# Patient Record
Sex: MALE | Race: WHITE | NOT HISPANIC OR LATINO | Employment: FULL TIME | URBAN - METROPOLITAN AREA
[De-identification: names, ages, dates, MRNs, and addresses within clinical notes are randomized per-mention and may not be internally consistent; named-entity substitution may affect disease eponyms.]

---

## 2021-01-18 ENCOUNTER — HOSPITAL ENCOUNTER (OUTPATIENT)
Dept: OTHER | Facility: HOSPITAL | Age: 51
Discharge: HOME | End: 2021-01-18
Attending: PHYSICIAN ASSISTANT

## 2021-07-30 ENCOUNTER — HOSPITAL ENCOUNTER (INPATIENT)
Facility: HOSPITAL | Age: 51
LOS: 9 days | Discharge: HOME | End: 2021-08-08
Attending: EMERGENCY MEDICINE | Admitting: STUDENT IN AN ORGANIZED HEALTH CARE EDUCATION/TRAINING PROGRAM

## 2021-07-30 ENCOUNTER — APPOINTMENT (OUTPATIENT)
Dept: GENERAL RADIOLOGY | Facility: HOSPITAL | Age: 51
End: 2021-07-30

## 2021-07-30 DIAGNOSIS — J18.9 PNEUMONIA OF BOTH LUNGS DUE TO INFECTIOUS ORGANISM, UNSPECIFIED PART OF LUNG: Primary | ICD-10-CM

## 2021-07-30 DIAGNOSIS — J96.01 ACUTE RESPIRATORY FAILURE WITH HYPOXIA (HCC): ICD-10-CM

## 2021-07-30 DIAGNOSIS — U07.1 COVID-19: ICD-10-CM

## 2021-07-30 DIAGNOSIS — Z78.9 DECREASED ACTIVITIES OF DAILY LIVING (ADL): ICD-10-CM

## 2021-07-30 PROBLEM — E03.9 HYPOTHYROIDISM: Status: ACTIVE | Noted: 2021-07-30

## 2021-07-30 LAB
ALBUMIN SERPL-MCNC: 3.6 G/DL (ref 3.5–5.2)
ALBUMIN/GLOB SERPL: 1 G/DL
ALP SERPL-CCNC: 83 U/L (ref 39–117)
ALT SERPL W P-5'-P-CCNC: 86 U/L (ref 1–41)
ANION GAP SERPL CALCULATED.3IONS-SCNC: 16.6 MMOL/L (ref 5–15)
AST SERPL-CCNC: 134 U/L (ref 1–40)
BASOPHILS # BLD AUTO: 0.02 10*3/MM3 (ref 0–0.2)
BASOPHILS NFR BLD AUTO: 0.2 % (ref 0–1.5)
BILIRUB SERPL-MCNC: 1.1 MG/DL (ref 0–1.2)
BUN SERPL-MCNC: 22 MG/DL (ref 6–20)
BUN/CREAT SERPL: 16.3 (ref 7–25)
CALCIUM SPEC-SCNC: 9 MG/DL (ref 8.6–10.5)
CHLORIDE SERPL-SCNC: 89 MMOL/L (ref 98–107)
CO2 SERPL-SCNC: 25.4 MMOL/L (ref 22–29)
CREAT SERPL-MCNC: 1.35 MG/DL (ref 0.76–1.27)
D DIMER PPP FEU-MCNC: 0.62 MG/L (FEU) (ref 0–0.59)
D-LACTATE SERPL-SCNC: 1.5 MMOL/L (ref 0.5–2)
D-LACTATE SERPL-SCNC: 2.7 MMOL/L (ref 0.5–2)
DEPRECATED RDW RBC AUTO: 41.3 FL (ref 37–54)
EOSINOPHIL # BLD AUTO: 0 10*3/MM3 (ref 0–0.4)
EOSINOPHIL NFR BLD AUTO: 0 % (ref 0.3–6.2)
ERYTHROCYTE [DISTWIDTH] IN BLOOD BY AUTOMATED COUNT: 13.4 % (ref 12.3–15.4)
FERRITIN SERPL-MCNC: 3585 NG/ML (ref 30–400)
GFR SERPL CREATININE-BSD FRML MDRD: 55 ML/MIN/1.73
GLOBULIN UR ELPH-MCNC: 3.6 GM/DL
GLUCOSE SERPL-MCNC: 102 MG/DL (ref 65–99)
HCT VFR BLD AUTO: 54.2 % (ref 37.5–51)
HGB BLD-MCNC: 18.9 G/DL (ref 13–17.7)
HOLD SPECIMEN: NORMAL
IMM GRANULOCYTES # BLD AUTO: 0.1 10*3/MM3 (ref 0–0.05)
IMM GRANULOCYTES NFR BLD AUTO: 1.2 % (ref 0–0.5)
INR PPP: 1.03 (ref 2–3)
LDH SERPL-CCNC: 671 U/L (ref 135–225)
LYMPHOCYTES # BLD AUTO: 0.9 10*3/MM3 (ref 0.7–3.1)
LYMPHOCYTES NFR BLD AUTO: 11.1 % (ref 19.6–45.3)
MCH RBC QN AUTO: 30 PG (ref 26.6–33)
MCHC RBC AUTO-ENTMCNC: 34.9 G/DL (ref 31.5–35.7)
MCV RBC AUTO: 85.9 FL (ref 79–97)
MONOCYTES # BLD AUTO: 0.88 10*3/MM3 (ref 0.1–0.9)
MONOCYTES NFR BLD AUTO: 10.8 % (ref 5–12)
NEUTROPHILS NFR BLD AUTO: 6.24 10*3/MM3 (ref 1.7–7)
NEUTROPHILS NFR BLD AUTO: 76.7 % (ref 42.7–76)
NRBC BLD AUTO-RTO: 0 /100 WBC (ref 0–0.2)
NT-PROBNP SERPL-MCNC: 24.7 PG/ML (ref 0–900)
PLATELET # BLD AUTO: 245 10*3/MM3 (ref 140–450)
PMV BLD AUTO: 11.2 FL (ref 6–12)
POTASSIUM SERPL-SCNC: 3.9 MMOL/L (ref 3.5–5.2)
PROCALCITONIN SERPL-MCNC: 0.23 NG/ML (ref 0–0.25)
PROT SERPL-MCNC: 7.2 G/DL (ref 6–8.5)
PROTHROMBIN TIME: 11.2 SECONDS (ref 9.4–12)
RBC # BLD AUTO: 6.31 10*6/MM3 (ref 4.14–5.8)
SODIUM SERPL-SCNC: 131 MMOL/L (ref 136–145)
TROPONIN T SERPL-MCNC: <0.01 NG/ML (ref 0–0.03)
WBC # BLD AUTO: 8.14 10*3/MM3 (ref 3.4–10.8)
WHOLE BLOOD HOLD SPECIMEN: NORMAL

## 2021-07-30 PROCEDURE — 83880 ASSAY OF NATRIURETIC PEPTIDE: CPT | Performed by: EMERGENCY MEDICINE

## 2021-07-30 PROCEDURE — 85025 COMPLETE CBC W/AUTO DIFF WBC: CPT | Performed by: EMERGENCY MEDICINE

## 2021-07-30 PROCEDURE — 87040 BLOOD CULTURE FOR BACTERIA: CPT | Performed by: EMERGENCY MEDICINE

## 2021-07-30 PROCEDURE — 83605 ASSAY OF LACTIC ACID: CPT | Performed by: EMERGENCY MEDICINE

## 2021-07-30 PROCEDURE — 85610 PROTHROMBIN TIME: CPT | Performed by: EMERGENCY MEDICINE

## 2021-07-30 PROCEDURE — 71045 X-RAY EXAM CHEST 1 VIEW: CPT | Performed by: RADIOLOGY

## 2021-07-30 PROCEDURE — 94799 UNLISTED PULMONARY SVC/PX: CPT

## 2021-07-30 PROCEDURE — 99223 1ST HOSP IP/OBS HIGH 75: CPT | Performed by: GENERAL PRACTICE

## 2021-07-30 PROCEDURE — 25010000002 CEFTRIAXONE PER 250 MG: Performed by: EMERGENCY MEDICINE

## 2021-07-30 PROCEDURE — 83615 LACTATE (LD) (LDH) ENZYME: CPT | Performed by: PHYSICIAN ASSISTANT

## 2021-07-30 PROCEDURE — 84484 ASSAY OF TROPONIN QUANT: CPT | Performed by: EMERGENCY MEDICINE

## 2021-07-30 PROCEDURE — XW033E5 INTRODUCTION OF REMDESIVIR ANTI-INFECTIVE INTO PERIPHERAL VEIN, PERCUTANEOUS APPROACH, NEW TECHNOLOGY GROUP 5: ICD-10-PCS | Performed by: GENERAL PRACTICE

## 2021-07-30 PROCEDURE — 71045 X-RAY EXAM CHEST 1 VIEW: CPT

## 2021-07-30 PROCEDURE — 25010000002 AZITHROMYCIN PER 500 MG: Performed by: EMERGENCY MEDICINE

## 2021-07-30 PROCEDURE — 82728 ASSAY OF FERRITIN: CPT | Performed by: PHYSICIAN ASSISTANT

## 2021-07-30 PROCEDURE — 93005 ELECTROCARDIOGRAM TRACING: CPT | Performed by: EMERGENCY MEDICINE

## 2021-07-30 PROCEDURE — 85379 FIBRIN DEGRADATION QUANT: CPT | Performed by: PHYSICIAN ASSISTANT

## 2021-07-30 PROCEDURE — 80053 COMPREHEN METABOLIC PANEL: CPT | Performed by: EMERGENCY MEDICINE

## 2021-07-30 PROCEDURE — 25010000002 DEXAMETHASONE PER 1 MG: Performed by: EMERGENCY MEDICINE

## 2021-07-30 PROCEDURE — 99285 EMERGENCY DEPT VISIT HI MDM: CPT

## 2021-07-30 PROCEDURE — 94660 CPAP INITIATION&MGMT: CPT

## 2021-07-30 PROCEDURE — 93010 ELECTROCARDIOGRAM REPORT: CPT | Performed by: INTERNAL MEDICINE

## 2021-07-30 PROCEDURE — 84145 PROCALCITONIN (PCT): CPT | Performed by: PHYSICIAN ASSISTANT

## 2021-07-30 RX ORDER — DEXAMETHASONE SODIUM PHOSPHATE 10 MG/ML
10 INJECTION INTRAMUSCULAR; INTRAVENOUS ONCE
Status: COMPLETED | OUTPATIENT
Start: 2021-07-30 | End: 2021-07-30

## 2021-07-30 RX ORDER — SODIUM CHLORIDE 0.9 % (FLUSH) 0.9 %
10 SYRINGE (ML) INJECTION AS NEEDED
Status: DISCONTINUED | OUTPATIENT
Start: 2021-07-30 | End: 2021-08-08 | Stop reason: HOSPADM

## 2021-07-30 RX ORDER — SODIUM CHLORIDE 9 MG/ML
75 INJECTION, SOLUTION INTRAVENOUS CONTINUOUS
Status: DISCONTINUED | OUTPATIENT
Start: 2021-07-30 | End: 2021-07-31

## 2021-07-30 RX ORDER — LEVOTHYROXINE SODIUM 0.12 MG/1
125 TABLET ORAL DAILY
COMMUNITY

## 2021-07-30 RX ADMIN — AZITHROMYCIN 500 MG: 500 INJECTION, POWDER, LYOPHILIZED, FOR SOLUTION INTRAVENOUS at 20:49

## 2021-07-30 RX ADMIN — CEFTRIAXONE SODIUM 1 G: 1 INJECTION, POWDER, FOR SOLUTION INTRAMUSCULAR; INTRAVENOUS at 20:37

## 2021-07-30 RX ADMIN — SODIUM CHLORIDE 1000 ML: 9 INJECTION, SOLUTION INTRAVENOUS at 20:45

## 2021-07-30 RX ADMIN — DEXAMETHASONE SODIUM PHOSPHATE 10 MG: 10 INJECTION INTRAMUSCULAR; INTRAVENOUS at 20:46

## 2021-07-31 PROBLEM — U07.1 ACUTE RESPIRATORY DISTRESS SYNDROME (ARDS) DUE TO 2019 NOVEL CORONAVIRUS: Status: ACTIVE | Noted: 2021-07-31

## 2021-07-31 PROBLEM — U07.1 ACUTE HYPOXEMIC RESPIRATORY FAILURE DUE TO COVID-19: Status: ACTIVE | Noted: 2021-07-31

## 2021-07-31 PROBLEM — J12.82 PNEUMONIA DUE TO COVID-19 VIRUS: Status: ACTIVE | Noted: 2021-07-31

## 2021-07-31 PROBLEM — J80 ACUTE RESPIRATORY DISTRESS SYNDROME (ARDS) DUE TO 2019 NOVEL CORONAVIRUS (HCC): Status: ACTIVE | Noted: 2021-07-31

## 2021-07-31 PROBLEM — U07.1 PNEUMONIA DUE TO COVID-19 VIRUS: Status: ACTIVE | Noted: 2021-07-31

## 2021-07-31 PROBLEM — J96.01 ACUTE HYPOXEMIC RESPIRATORY FAILURE DUE TO COVID-19: Status: ACTIVE | Noted: 2021-07-31

## 2021-07-31 PROBLEM — R74.01 TRANSAMINITIS: Status: ACTIVE | Noted: 2021-07-31

## 2021-07-31 PROBLEM — N17.9 ACUTE KIDNEY INJURY (HCC): Status: ACTIVE | Noted: 2021-07-31

## 2021-07-31 PROBLEM — E87.1 HYPONATREMIA: Status: ACTIVE | Noted: 2021-07-31

## 2021-07-31 LAB
ALBUMIN SERPL-MCNC: 2.9 G/DL (ref 3.5–5.2)
ALBUMIN/GLOB SERPL: 0.9 G/DL
ALP SERPL-CCNC: 86 U/L (ref 39–117)
ALT SERPL W P-5'-P-CCNC: 97 U/L (ref 1–41)
ANION GAP SERPL CALCULATED.3IONS-SCNC: 13.8 MMOL/L (ref 5–15)
ARTERIAL PATENCY WRIST A: ABNORMAL
AST SERPL-CCNC: 157 U/L (ref 1–40)
BASE EXCESS BLDA CALC-SCNC: 0.5 MMOL/L (ref -2–2)
BASOPHILS # BLD AUTO: 0.06 10*3/MM3 (ref 0–0.2)
BASOPHILS NFR BLD AUTO: 1.1 % (ref 0–1.5)
BDY SITE: ABNORMAL
BILIRUB CONJ SERPL-MCNC: 0.3 MG/DL (ref 0–0.3)
BILIRUB SERPL-MCNC: 0.7 MG/DL (ref 0–1.2)
BUN SERPL-MCNC: 19 MG/DL (ref 6–20)
BUN/CREAT SERPL: 19.8 (ref 7–25)
CALCIUM SPEC-SCNC: 8.2 MG/DL (ref 8.6–10.5)
CHLORIDE SERPL-SCNC: 95 MMOL/L (ref 98–107)
CK SERPL-CCNC: 973 U/L (ref 20–200)
CO2 SERPL-SCNC: 23.2 MMOL/L (ref 22–29)
COHGB MFR BLD: 0.3 % (ref 0–1.5)
CREAT SERPL-MCNC: 0.96 MG/DL (ref 0.76–1.27)
CRP SERPL-MCNC: 10.59 MG/DL (ref 0–0.5)
D DIMER PPP FEU-MCNC: 0.59 MG/L (FEU) (ref 0–0.59)
DEPRECATED RDW RBC AUTO: 41 FL (ref 37–54)
EOSINOPHIL # BLD AUTO: 0 10*3/MM3 (ref 0–0.4)
EOSINOPHIL NFR BLD AUTO: 0 % (ref 0.3–6.2)
ERYTHROCYTE [DISTWIDTH] IN BLOOD BY AUTOMATED COUNT: 13 % (ref 12.3–15.4)
FERRITIN SERPL-MCNC: 3340 NG/ML (ref 30–400)
FHHB: 10.9 % (ref 0–5)
FIBRINOGEN PPP-MCNC: 554 MG/DL (ref 200–450)
GAS FLOW AIRWAY: 60 LPM
GFR SERPL CREATININE-BSD FRML MDRD: 82 ML/MIN/1.73
GLOBULIN UR ELPH-MCNC: 3.4 GM/DL
GLUCOSE SERPL-MCNC: 131 MG/DL (ref 65–99)
HCO3 BLDA-SCNC: 23.4 MMOL/L (ref 22–26)
HCT VFR BLD AUTO: 49.5 % (ref 37.5–51)
HGB BLD-MCNC: 17.2 G/DL (ref 13–17.7)
HGB BLDA-MCNC: 17.9 G/DL (ref 13.8–16.4)
IMM GRANULOCYTES # BLD AUTO: 0.15 10*3/MM3 (ref 0–0.05)
IMM GRANULOCYTES NFR BLD AUTO: 2.8 % (ref 0–0.5)
INHALED O2 CONCENTRATION: 100 %
L PNEUMO1 AG UR QL IA: NEGATIVE
LDH SERPL-CCNC: 591 U/L (ref 135–225)
LYMPHOCYTES # BLD AUTO: 0.66 10*3/MM3 (ref 0.7–3.1)
LYMPHOCYTES NFR BLD AUTO: 12.2 % (ref 19.6–45.3)
MAGNESIUM SERPL-MCNC: 2.2 MG/DL (ref 1.6–2.6)
MCH RBC QN AUTO: 30.2 PG (ref 26.6–33)
MCHC RBC AUTO-ENTMCNC: 34.7 G/DL (ref 31.5–35.7)
MCV RBC AUTO: 86.8 FL (ref 79–97)
METHGB BLD QL: 0.3 % (ref 0–1.5)
MODALITY: ABNORMAL
MONOCYTES # BLD AUTO: 0.32 10*3/MM3 (ref 0.1–0.9)
MONOCYTES NFR BLD AUTO: 5.9 % (ref 5–12)
NEUTROPHILS NFR BLD AUTO: 4.22 10*3/MM3 (ref 1.7–7)
NEUTROPHILS NFR BLD AUTO: 78 % (ref 42.7–76)
NRBC BLD AUTO-RTO: 0 /100 WBC (ref 0–0.2)
OXYHGB MFR BLDV: 88.5 % (ref 94–99)
PCO2 BLDA: 33.7 MM HG (ref 35–45)
PH BLDA: 7.46 PH UNITS (ref 7.35–7.45)
PLATELET # BLD AUTO: 222 10*3/MM3 (ref 140–450)
PMV BLD AUTO: 11.4 FL (ref 6–12)
PO2 BLD: 54 MM[HG] (ref 0–500)
PO2 BLDA: 54 MM HG (ref 80–100)
POTASSIUM SERPL-SCNC: 4.4 MMOL/L (ref 3.5–5.2)
PROT SERPL-MCNC: 6.3 G/DL (ref 6–8.5)
QT INTERVAL: 326 MS
RBC # BLD AUTO: 5.7 10*6/MM3 (ref 4.14–5.8)
S PNEUM AG SPEC QL LA: NEGATIVE
SAO2 % BLDCOA: 89 % (ref 95–99)
SODIUM SERPL-SCNC: 132 MMOL/L (ref 136–145)
WBC # BLD AUTO: 5.41 10*3/MM3 (ref 3.4–10.8)

## 2021-07-31 PROCEDURE — 25010000002 ENOXAPARIN PER 10 MG: Performed by: GENERAL PRACTICE

## 2021-07-31 PROCEDURE — 82728 ASSAY OF FERRITIN: CPT | Performed by: PHYSICIAN ASSISTANT

## 2021-07-31 PROCEDURE — 85025 COMPLETE CBC W/AUTO DIFF WBC: CPT | Performed by: PHYSICIAN ASSISTANT

## 2021-07-31 PROCEDURE — 25010000002 TOCILIZUMAB 200 MG/10ML SOLUTION 10 ML VIAL: Performed by: INTERNAL MEDICINE

## 2021-07-31 PROCEDURE — 82550 ASSAY OF CK (CPK): CPT | Performed by: PHYSICIAN ASSISTANT

## 2021-07-31 PROCEDURE — 25010000002 CEFTRIAXONE PER 250 MG: Performed by: PHYSICIAN ASSISTANT

## 2021-07-31 PROCEDURE — 85384 FIBRINOGEN ACTIVITY: CPT | Performed by: PHYSICIAN ASSISTANT

## 2021-07-31 PROCEDURE — 25010000002 FUROSEMIDE PER 20 MG: Performed by: INTERNAL MEDICINE

## 2021-07-31 PROCEDURE — 82375 ASSAY CARBOXYHB QUANT: CPT | Performed by: GENERAL PRACTICE

## 2021-07-31 PROCEDURE — 36600 WITHDRAWAL OF ARTERIAL BLOOD: CPT | Performed by: GENERAL PRACTICE

## 2021-07-31 PROCEDURE — 94799 UNLISTED PULMONARY SVC/PX: CPT

## 2021-07-31 PROCEDURE — 99233 SBSQ HOSP IP/OBS HIGH 50: CPT | Performed by: INTERNAL MEDICINE

## 2021-07-31 PROCEDURE — 87899 AGENT NOS ASSAY W/OPTIC: CPT | Performed by: PHYSICIAN ASSISTANT

## 2021-07-31 PROCEDURE — 63710000001 DEXAMETHASONE PER 0.25 MG: Performed by: PHYSICIAN ASSISTANT

## 2021-07-31 PROCEDURE — 85379 FIBRIN DEGRADATION QUANT: CPT | Performed by: PHYSICIAN ASSISTANT

## 2021-07-31 PROCEDURE — 99291 CRITICAL CARE FIRST HOUR: CPT | Performed by: INTERNAL MEDICINE

## 2021-07-31 PROCEDURE — 82805 BLOOD GASES W/O2 SATURATION: CPT | Performed by: GENERAL PRACTICE

## 2021-07-31 PROCEDURE — 83050 HGB METHEMOGLOBIN QUAN: CPT | Performed by: GENERAL PRACTICE

## 2021-07-31 PROCEDURE — 80053 COMPREHEN METABOLIC PANEL: CPT | Performed by: PHYSICIAN ASSISTANT

## 2021-07-31 PROCEDURE — 83615 LACTATE (LD) (LDH) ENZYME: CPT | Performed by: PHYSICIAN ASSISTANT

## 2021-07-31 PROCEDURE — 83735 ASSAY OF MAGNESIUM: CPT | Performed by: PHYSICIAN ASSISTANT

## 2021-07-31 PROCEDURE — 25010000002 ENOXAPARIN PER 10 MG: Performed by: INTERNAL MEDICINE

## 2021-07-31 PROCEDURE — 25010000002 AZITHROMYCIN PER 500 MG: Performed by: PHYSICIAN ASSISTANT

## 2021-07-31 PROCEDURE — 86140 C-REACTIVE PROTEIN: CPT | Performed by: PHYSICIAN ASSISTANT

## 2021-07-31 PROCEDURE — XW033H5 INTRODUCTION OF TOCILIZUMAB INTO PERIPHERAL VEIN, PERCUTANEOUS APPROACH, NEW TECHNOLOGY GROUP 5: ICD-10-PCS | Performed by: INTERNAL MEDICINE

## 2021-07-31 PROCEDURE — 82248 BILIRUBIN DIRECT: CPT | Performed by: GENERAL PRACTICE

## 2021-07-31 PROCEDURE — 36415 COLL VENOUS BLD VENIPUNCTURE: CPT | Performed by: PHYSICIAN ASSISTANT

## 2021-07-31 RX ORDER — LEVOTHYROXINE SODIUM 0.12 MG/1
125 TABLET ORAL EVERY MORNING
Status: DISCONTINUED | OUTPATIENT
Start: 2021-07-31 | End: 2021-08-08 | Stop reason: HOSPADM

## 2021-07-31 RX ORDER — HEPARIN SODIUM 5000 [USP'U]/ML
5000 INJECTION, SOLUTION INTRAVENOUS; SUBCUTANEOUS EVERY 8 HOURS SCHEDULED
Status: DISCONTINUED | OUTPATIENT
Start: 2021-07-31 | End: 2021-07-31 | Stop reason: ALTCHOICE

## 2021-07-31 RX ORDER — IPRATROPIUM BROMIDE AND ALBUTEROL SULFATE 2.5; .5 MG/3ML; MG/3ML
3 SOLUTION RESPIRATORY (INHALATION)
Status: DISCONTINUED | OUTPATIENT
Start: 2021-07-31 | End: 2021-08-08 | Stop reason: HOSPADM

## 2021-07-31 RX ORDER — BUDESONIDE 0.5 MG/2ML
0.5 INHALANT ORAL
Status: DISCONTINUED | OUTPATIENT
Start: 2021-07-31 | End: 2021-08-08 | Stop reason: HOSPADM

## 2021-07-31 RX ORDER — FUROSEMIDE 10 MG/ML
40 INJECTION INTRAMUSCULAR; INTRAVENOUS
Status: DISCONTINUED | OUTPATIENT
Start: 2021-07-31 | End: 2021-07-31

## 2021-07-31 RX ORDER — DEXAMETHASONE SODIUM PHOSPHATE 10 MG/ML
6 INJECTION INTRAMUSCULAR; INTRAVENOUS DAILY
Status: DISCONTINUED | OUTPATIENT
Start: 2021-07-31 | End: 2021-08-02

## 2021-07-31 RX ORDER — ARFORMOTEROL TARTRATE 15 UG/2ML
15 SOLUTION RESPIRATORY (INHALATION)
Status: DISCONTINUED | OUTPATIENT
Start: 2021-07-31 | End: 2021-08-08 | Stop reason: HOSPADM

## 2021-07-31 RX ORDER — SODIUM CHLORIDE FOR INHALATION 3 %
4 VIAL, NEBULIZER (ML) INHALATION ONCE AS NEEDED
Status: COMPLETED | OUTPATIENT
Start: 2021-07-31 | End: 2021-07-31

## 2021-07-31 RX ORDER — DEXAMETHASONE 4 MG/1
6 TABLET ORAL DAILY
Status: DISCONTINUED | OUTPATIENT
Start: 2021-07-31 | End: 2021-08-02

## 2021-07-31 RX ORDER — ALBUTEROL SULFATE 2.5 MG/3ML
2.5 SOLUTION RESPIRATORY (INHALATION) ONCE AS NEEDED
Status: DISCONTINUED | OUTPATIENT
Start: 2021-07-31 | End: 2021-08-08 | Stop reason: HOSPADM

## 2021-07-31 RX ORDER — PANTOPRAZOLE SODIUM 40 MG/1
40 TABLET, DELAYED RELEASE ORAL
Status: DISCONTINUED | OUTPATIENT
Start: 2021-07-31 | End: 2021-08-08 | Stop reason: HOSPADM

## 2021-07-31 RX ADMIN — IPRATROPIUM BROMIDE AND ALBUTEROL SULFATE 3 ML: .5; 2.5 SOLUTION RESPIRATORY (INHALATION) at 08:18

## 2021-07-31 RX ADMIN — ENOXAPARIN SODIUM 120 MG: 120 INJECTION SUBCUTANEOUS at 00:59

## 2021-07-31 RX ADMIN — LEVOTHYROXINE SODIUM 125 MCG: 125 TABLET ORAL at 08:00

## 2021-07-31 RX ADMIN — IPRATROPIUM BROMIDE AND ALBUTEROL SULFATE 3 ML: .5; 2.5 SOLUTION RESPIRATORY (INHALATION) at 12:20

## 2021-07-31 RX ADMIN — ENOXAPARIN SODIUM 40 MG: 40 INJECTION SUBCUTANEOUS at 12:32

## 2021-07-31 RX ADMIN — TOCILIZUMAB 800 MG: 20 INJECTION, SOLUTION, CONCENTRATE INTRAVENOUS at 12:33

## 2021-07-31 RX ADMIN — SODIUM CHLORIDE SOLN NEBU 3% 4 ML: 3 NEBU SOLN at 19:16

## 2021-07-31 RX ADMIN — REMDESIVIR 200 MG: 100 INJECTION, POWDER, LYOPHILIZED, FOR SOLUTION INTRAVENOUS at 00:59

## 2021-07-31 RX ADMIN — CEFTRIAXONE 1 G: 10 INJECTION, POWDER, FOR SOLUTION INTRAVENOUS at 08:00

## 2021-07-31 RX ADMIN — FUROSEMIDE 40 MG: 10 INJECTION, SOLUTION INTRAMUSCULAR; INTRAVENOUS at 17:48

## 2021-07-31 RX ADMIN — IPRATROPIUM BROMIDE AND ALBUTEROL SULFATE 3 ML: .5; 2.5 SOLUTION RESPIRATORY (INHALATION) at 19:15

## 2021-07-31 RX ADMIN — AZITHROMYCIN 500 MG: 500 INJECTION, POWDER, LYOPHILIZED, FOR SOLUTION INTRAVENOUS at 08:00

## 2021-07-31 RX ADMIN — PANTOPRAZOLE SODIUM 40 MG: 40 TABLET, DELAYED RELEASE ORAL at 12:32

## 2021-07-31 RX ADMIN — ARFORMOTEROL TARTRATE 15 MCG: 15 SOLUTION RESPIRATORY (INHALATION) at 19:15

## 2021-07-31 RX ADMIN — DEXAMETHASONE 6 MG: 4 TABLET ORAL at 08:00

## 2021-07-31 RX ADMIN — FUROSEMIDE 40 MG: 10 INJECTION, SOLUTION INTRAMUSCULAR; INTRAVENOUS at 12:32

## 2021-07-31 RX ADMIN — SODIUM CHLORIDE 75 ML/HR: 9 INJECTION, SOLUTION INTRAVENOUS at 01:00

## 2021-07-31 RX ADMIN — BUDESONIDE 0.5 MG: 0.5 INHALANT ORAL at 19:15

## 2021-08-01 LAB
ALBUMIN SERPL-MCNC: 3.1 G/DL (ref 3.5–5.2)
ALBUMIN/GLOB SERPL: 1 G/DL
ALP SERPL-CCNC: 87 U/L (ref 39–117)
ALT SERPL W P-5'-P-CCNC: 95 U/L (ref 1–41)
ANION GAP SERPL CALCULATED.3IONS-SCNC: 9.5 MMOL/L (ref 5–15)
AST SERPL-CCNC: 93 U/L (ref 1–40)
BASOPHILS # BLD AUTO: 0.04 10*3/MM3 (ref 0–0.2)
BASOPHILS NFR BLD AUTO: 0.4 % (ref 0–1.5)
BILIRUB CONJ SERPL-MCNC: 0.2 MG/DL (ref 0–0.3)
BILIRUB SERPL-MCNC: 0.6 MG/DL (ref 0–1.2)
BUN SERPL-MCNC: 21 MG/DL (ref 6–20)
BUN/CREAT SERPL: 19.4 (ref 7–25)
CALCIUM SPEC-SCNC: 8.8 MG/DL (ref 8.6–10.5)
CHLORIDE SERPL-SCNC: 99 MMOL/L (ref 98–107)
CK SERPL-CCNC: 326 U/L (ref 20–200)
CO2 SERPL-SCNC: 26.5 MMOL/L (ref 22–29)
CREAT SERPL-MCNC: 1.08 MG/DL (ref 0.76–1.27)
CRP SERPL-MCNC: 5.76 MG/DL (ref 0–0.5)
DEPRECATED RDW RBC AUTO: 40.1 FL (ref 37–54)
EOSINOPHIL # BLD AUTO: 0 10*3/MM3 (ref 0–0.4)
EOSINOPHIL NFR BLD AUTO: 0 % (ref 0.3–6.2)
ERYTHROCYTE [DISTWIDTH] IN BLOOD BY AUTOMATED COUNT: 12.8 % (ref 12.3–15.4)
FERRITIN SERPL-MCNC: 3486 NG/ML (ref 30–400)
GFR SERPL CREATININE-BSD FRML MDRD: 72 ML/MIN/1.73
GLOBULIN UR ELPH-MCNC: 3.2 GM/DL
GLUCOSE SERPL-MCNC: 129 MG/DL (ref 65–99)
HCT VFR BLD AUTO: 48.8 % (ref 37.5–51)
HGB BLD-MCNC: 16.8 G/DL (ref 13–17.7)
IMM GRANULOCYTES # BLD AUTO: 0.18 10*3/MM3 (ref 0–0.05)
IMM GRANULOCYTES NFR BLD AUTO: 1.7 % (ref 0–0.5)
LYMPHOCYTES # BLD AUTO: 0.79 10*3/MM3 (ref 0.7–3.1)
LYMPHOCYTES NFR BLD AUTO: 7.4 % (ref 19.6–45.3)
MAGNESIUM SERPL-MCNC: 2.3 MG/DL (ref 1.6–2.6)
MCH RBC QN AUTO: 29.7 PG (ref 26.6–33)
MCHC RBC AUTO-ENTMCNC: 34.4 G/DL (ref 31.5–35.7)
MCV RBC AUTO: 86.2 FL (ref 79–97)
MONOCYTES # BLD AUTO: 0.9 10*3/MM3 (ref 0.1–0.9)
MONOCYTES NFR BLD AUTO: 8.5 % (ref 5–12)
NEUTROPHILS NFR BLD AUTO: 8.73 10*3/MM3 (ref 1.7–7)
NEUTROPHILS NFR BLD AUTO: 82 % (ref 42.7–76)
NRBC BLD AUTO-RTO: 0 /100 WBC (ref 0–0.2)
PLATELET # BLD AUTO: 284 10*3/MM3 (ref 140–450)
PMV BLD AUTO: 11.7 FL (ref 6–12)
POTASSIUM SERPL-SCNC: 3.9 MMOL/L (ref 3.5–5.2)
PROT SERPL-MCNC: 6.3 G/DL (ref 6–8.5)
RBC # BLD AUTO: 5.66 10*6/MM3 (ref 4.14–5.8)
SODIUM SERPL-SCNC: 135 MMOL/L (ref 136–145)
WBC # BLD AUTO: 10.64 10*3/MM3 (ref 3.4–10.8)

## 2021-08-01 PROCEDURE — 36415 COLL VENOUS BLD VENIPUNCTURE: CPT | Performed by: GENERAL PRACTICE

## 2021-08-01 PROCEDURE — 25010000002 DEXAMETHASONE PER 1 MG: Performed by: PHYSICIAN ASSISTANT

## 2021-08-01 PROCEDURE — 25010000002 TOCILIZUMAB 200 MG/10ML SOLUTION 10 ML VIAL: Performed by: INTERNAL MEDICINE

## 2021-08-01 PROCEDURE — 82728 ASSAY OF FERRITIN: CPT | Performed by: PHYSICIAN ASSISTANT

## 2021-08-01 PROCEDURE — 99233 SBSQ HOSP IP/OBS HIGH 50: CPT | Performed by: INTERNAL MEDICINE

## 2021-08-01 PROCEDURE — 25010000002 CEFTRIAXONE PER 250 MG: Performed by: PHYSICIAN ASSISTANT

## 2021-08-01 PROCEDURE — 82248 BILIRUBIN DIRECT: CPT | Performed by: GENERAL PRACTICE

## 2021-08-01 PROCEDURE — 86140 C-REACTIVE PROTEIN: CPT | Performed by: PHYSICIAN ASSISTANT

## 2021-08-01 PROCEDURE — 94799 UNLISTED PULMONARY SVC/PX: CPT

## 2021-08-01 PROCEDURE — 25010000002 ENOXAPARIN PER 10 MG: Performed by: INTERNAL MEDICINE

## 2021-08-01 PROCEDURE — 83735 ASSAY OF MAGNESIUM: CPT | Performed by: PHYSICIAN ASSISTANT

## 2021-08-01 PROCEDURE — 80053 COMPREHEN METABOLIC PANEL: CPT | Performed by: PHYSICIAN ASSISTANT

## 2021-08-01 PROCEDURE — 82550 ASSAY OF CK (CPK): CPT | Performed by: PHYSICIAN ASSISTANT

## 2021-08-01 PROCEDURE — 94760 N-INVAS EAR/PLS OXIMETRY 1: CPT

## 2021-08-01 PROCEDURE — 25010000002 AZITHROMYCIN PER 500 MG: Performed by: PHYSICIAN ASSISTANT

## 2021-08-01 PROCEDURE — 85025 COMPLETE CBC W/AUTO DIFF WBC: CPT | Performed by: PHYSICIAN ASSISTANT

## 2021-08-01 PROCEDURE — 99291 CRITICAL CARE FIRST HOUR: CPT | Performed by: INTERNAL MEDICINE

## 2021-08-01 RX ADMIN — LEVOTHYROXINE SODIUM 125 MCG: 125 TABLET ORAL at 09:50

## 2021-08-01 RX ADMIN — ARFORMOTEROL TARTRATE 15 MCG: 15 SOLUTION RESPIRATORY (INHALATION) at 07:08

## 2021-08-01 RX ADMIN — AZITHROMYCIN 500 MG: 500 INJECTION, POWDER, LYOPHILIZED, FOR SOLUTION INTRAVENOUS at 09:08

## 2021-08-01 RX ADMIN — BUDESONIDE 0.5 MG: 0.5 INHALANT ORAL at 18:56

## 2021-08-01 RX ADMIN — BUDESONIDE 0.5 MG: 0.5 INHALANT ORAL at 07:08

## 2021-08-01 RX ADMIN — TOCILIZUMAB 800 MG: 20 INJECTION, SOLUTION, CONCENTRATE INTRAVENOUS at 13:37

## 2021-08-01 RX ADMIN — REMDESIVIR 100 MG: 100 INJECTION, POWDER, LYOPHILIZED, FOR SOLUTION INTRAVENOUS at 13:44

## 2021-08-01 RX ADMIN — DEXAMETHASONE SODIUM PHOSPHATE 6 MG: 10 INJECTION INTRAMUSCULAR; INTRAVENOUS at 09:08

## 2021-08-01 RX ADMIN — ARFORMOTEROL TARTRATE 15 MCG: 15 SOLUTION RESPIRATORY (INHALATION) at 18:55

## 2021-08-01 RX ADMIN — PANTOPRAZOLE SODIUM 40 MG: 40 TABLET, DELAYED RELEASE ORAL at 06:08

## 2021-08-01 RX ADMIN — CEFTRIAXONE 1 G: 10 INJECTION, POWDER, FOR SOLUTION INTRAVENOUS at 09:08

## 2021-08-01 RX ADMIN — ENOXAPARIN SODIUM 40 MG: 40 INJECTION SUBCUTANEOUS at 13:37

## 2021-08-01 RX ADMIN — IPRATROPIUM BROMIDE AND ALBUTEROL SULFATE 3 ML: .5; 2.5 SOLUTION RESPIRATORY (INHALATION) at 07:08

## 2021-08-01 RX ADMIN — IPRATROPIUM BROMIDE AND ALBUTEROL SULFATE 3 ML: .5; 2.5 SOLUTION RESPIRATORY (INHALATION) at 12:48

## 2021-08-01 RX ADMIN — IPRATROPIUM BROMIDE AND ALBUTEROL SULFATE 3 ML: .5; 2.5 SOLUTION RESPIRATORY (INHALATION) at 18:55

## 2021-08-01 NOTE — PROGRESS NOTES
[facility]     Progress Note    Patient Name: Antonio Persaud  : 1970  MRN: [id number]  Primary Care Physician:  Debora Leonardo APRN  Date of admission: 2021    Subjective   Subjective     Chief Complaint: Respiratory failure    History of Present Illness  Patient critically ill in the intensive care unit  On 60 L of air Vo 100% oxygen  Has also been requiring nonrebreather on top of this  Has been lying in the prone position  Very nervous and anxious  Wife also is sick in the hospital    Review of Systems  Positive for fatigue  Positive for anxiety  Positive for decreased appetite  Positive for shortness of breath and coughing  Objective   Objective     Vitals:   Temp:  [98.8 °F (37.1 °C)] 98.8 °F (37.1 °C)  Heart Rate:  [59-95] 92  Resp:  [16-26] 25  BP: (112-166)/(69-99) 155/89  Flow (L/min):  [60] 60    Physical Exam   Vital Signs Reviewed  General ill-appearing male lying in the prone position  HEENT:  PERRL, EOMI.  OP, nares clear, no sinus tenderness on air Vo  Neck:  Supple, no JVD, no thyromegaly  Lymph: no axillary, cervical, supraclavicular lymphadenopathy noted bilaterally  Chest: Auscultation reveals coarse crackles throughout the posterior lung fields, patient does have significant dyspnea limiting his talking  CV: RRR, no MGR, pulses 2+, equal.  Abd:  Soft, NT, ND, + BS, no HSM  EXT:  no clubbing, no cyanosis, no edema, no joint tenderness  Neuro:  A&Ox3, CN grossly intact, no focal deficits.  Skin: No rashes or lesions noted  Result Review    Result Review:  I have personally reviewed the results from the time of this admission to 2021 15:04 EDT and agree with these findings:  [x]  Laboratory  [x]  Microbiology  [x]  Radiology  [x]  EKG/Telemetry   [x]  Cardiology/Vascular   []  Pathology  [x]  Old records  []  Other:  Most notable findings include: Chest x-ray showing multifocal pneumonia    Assessment/Plan   Assessment / Plan     Brief Patient Summary:  Antonio Persaud  is a 51 y.o. male who critically ill in intensive care unit with ARDS secondary to COVID-19    Active Hospital Problems:  Active Hospital Problems    Diagnosis    • Acute hypoxemic respiratory failure due to COVID-19 (CMS/HCC)    • Acute respiratory distress syndrome (ARDS) due to 2019 novel coronavirus (CMS/HCC)    • Pneumonia due to COVID-19 virus    • Hyponatremia    • Acute kidney injury (CMS/HCC)    • Transaminitis    • Pneumonia of both lungs due to infectious organism    • Hypothyroidism    • Acute respiratory failure with hypoxia (CMS/[facility])      Plan:    patient in the prone position  Continue air Vo  Orders written for BiPAP if needed  We will dose patient with the second dose of Actemra today 8/1/2021  Continue Pulmicort  Continue Brovana  Continue ceftriaxone and Zithromax  Hold off on Lasix at this time as patient has acute kidney injury that has recovered after gentle hydration   Continue remdesivir  Tinea follow daily LFTs and renal panel  Severe can worsen LFTs patient does have baseline transaminitis from Covid anyways     DVT prophylaxis:  Medical DVT prophylaxis orders are present.    CODE STATUS:    Code Status: CPR  Medical Interventions (Level of Support Prior to Arrest): Full    Patient is critically ill in ICU with  in the intensive care unit with ARDS secondary to COVID-19, respiratory failure and acute kidney injury  I spent 35 minutes of critical care time excluding any procedure notes, spent in review, analysis, obtaining history and physical, formulating care plan, and I lead multi-disciplinary critical care rounds with bedside nurse, respiratory therapist, clinical pharmacist and other allied services. I have discussed the base with primary service and other consultants as well      Electronically signed by Nam Chong DO, 08/01/21, 3:04 PM EDT.

## 2021-08-01 NOTE — PLAN OF CARE
Goal Outcome Evaluation:      Vitals stable, talked to patient and son about care plan. Patient proned for most of the night.

## 2021-08-01 NOTE — PROGRESS NOTES
[facility]   Hospitalist Progress Note  Date: 2021  Patient Name: Antonio Persaud  : 1970  MRN: [id number]  Date of admission: 2021      Subjective   Subjective     Chief Complaint:   Shortness of breath    Summary:   Antonio Persaud 51-year-old gentleman with a past medical history significant for Graves' disease.  Patient presented to the hospital on 2021 with chief complaint of shortness of breath.  Patient had tested positive for Covid around 1 week ago, patient was not vaccinated.  Patient was quickly taken to the ICU given his oxygen needs.  Patient has been placed on air Vo maxed out with nonrebreather over place.    Interval Followup:   Patient appears much improved today on rounds.  Patient was lying prone most of the night.  On rounds patient was on air Vo maxed out, however did not require nonrebreather over place.    Review of Systems   All systems were reviewed and negative except for: Significant dyspnea, anxiety    Objective   Objective     Vitals:   Temp:  [98.8 °F (37.1 °C)] 98.8 °F (37.1 °C)  Heart Rate:  [59-95] 77  Resp:  [16-26] 20  BP: (112-143)/(69-96) 127/76  Flow (L/min):  [60] 60  Physical Exam    Constitutional: Awake, alert, anxious (improved), increased work of breathing   Eyes: Pupils equal, sclerae anicteric, no conjunctival injection   HENT: NCAT, mucous membranes moist   Respiratory: Diminished breath sounds bilaterally, crackles bilaterally   Cardiovascular: RRR, no murmurs, rubs, or gallops, palpable pedal pulses bilaterally   Gastrointestinal: Positive bowel sounds, soft, nontender, nondistended   Musculoskeletal: No bilateral ankle edema, no clubbing or cyanosis to extremities   Psychiatric: Appropriate affect, cooperative   Neurologic: Oriented x 3, strength symmetric in all extremities, Cranial Nerves grossly intact to confrontation, speech clear   Skin: No rashes     Result Review    Result Review:  I have personally reviewed the results from the time  of this admission to 8/1/2021 11:26 EDT and agree with these findings:  [x]  Laboratory  [x]  Microbiology  [x]  Radiology  [x]  EKG/Telemetry   [x]  Cardiology/Vascular   []  Pathology  []  Old records  []  Other:    Assessment/Plan   Assessment / Plan     Assessment/Plan:  Covid 19 pneumonia  Acute hypoxic respiratory failure  Lactic acidosis, resolved  JEFE, resolved    Plan:  Patient remains admitted to the ICU for further care management  Consulting pulmonary critical care, appreciate assistance  Patient has been started on remdesivir, therapeutic drug monitoring  Continue CAP coverage antibiotics  Scheduled and as needed breathing treatments  Continue on steroids  Scheduled diuretics twice daily  Patient will be provided Actemra   Continue home Synthroid    Discussed plan with RN, pulmonologist    DVT prophylaxis:  Medical DVT prophylaxis orders are present.    CODE STATUS:   Code Status: CPR  Medical Interventions (Level of Support Prior to Arrest): Full      Electronically signed by Eulogio Osorio MD, 08/01/21, 11:34 AM EDT.

## 2021-08-02 LAB
ALBUMIN SERPL-MCNC: 2.7 G/DL (ref 3.5–5.2)
ALBUMIN/GLOB SERPL: 0.8 G/DL
ALP SERPL-CCNC: 87 U/L (ref 39–117)
ALT SERPL W P-5'-P-CCNC: 84 U/L (ref 1–41)
ANION GAP SERPL CALCULATED.3IONS-SCNC: 13.8 MMOL/L (ref 5–15)
AST SERPL-CCNC: 63 U/L (ref 1–40)
BASOPHILS # BLD AUTO: 0.03 10*3/MM3 (ref 0–0.2)
BASOPHILS NFR BLD AUTO: 0.2 % (ref 0–1.5)
BILIRUB CONJ SERPL-MCNC: 0.2 MG/DL (ref 0–0.3)
BILIRUB SERPL-MCNC: 0.6 MG/DL (ref 0–1.2)
BUN SERPL-MCNC: 18 MG/DL (ref 6–20)
BUN/CREAT SERPL: 20.5 (ref 7–25)
CALCIUM SPEC-SCNC: 8.6 MG/DL (ref 8.6–10.5)
CHLORIDE SERPL-SCNC: 102 MMOL/L (ref 98–107)
CK SERPL-CCNC: 183 U/L (ref 20–200)
CO2 SERPL-SCNC: 21.2 MMOL/L (ref 22–29)
CREAT SERPL-MCNC: 0.88 MG/DL (ref 0.76–1.27)
CRP SERPL-MCNC: 2.44 MG/DL (ref 0–0.5)
D DIMER PPP FEU-MCNC: 1.24 MG/L (FEU) (ref 0–0.59)
DEPRECATED RDW RBC AUTO: 40.4 FL (ref 37–54)
EOSINOPHIL # BLD AUTO: 0 10*3/MM3 (ref 0–0.4)
EOSINOPHIL NFR BLD AUTO: 0 % (ref 0.3–6.2)
ERYTHROCYTE [DISTWIDTH] IN BLOOD BY AUTOMATED COUNT: 12.9 % (ref 12.3–15.4)
FERRITIN SERPL-MCNC: 2221 NG/ML (ref 30–400)
FIBRINOGEN PPP-MCNC: 454 MG/DL (ref 200–450)
GFR SERPL CREATININE-BSD FRML MDRD: 91 ML/MIN/1.73
GLOBULIN UR ELPH-MCNC: 3.2 GM/DL
GLUCOSE SERPL-MCNC: 109 MG/DL (ref 65–99)
HCT VFR BLD AUTO: 47.9 % (ref 37.5–51)
HGB BLD-MCNC: 16.5 G/DL (ref 13–17.7)
IMM GRANULOCYTES # BLD AUTO: 0.19 10*3/MM3 (ref 0–0.05)
IMM GRANULOCYTES NFR BLD AUTO: 1.5 % (ref 0–0.5)
LDH SERPL-CCNC: 606 U/L (ref 135–225)
LYMPHOCYTES # BLD AUTO: 0.75 10*3/MM3 (ref 0.7–3.1)
LYMPHOCYTES NFR BLD AUTO: 6.1 % (ref 19.6–45.3)
MAGNESIUM SERPL-MCNC: 2.2 MG/DL (ref 1.6–2.6)
MCH RBC QN AUTO: 29.7 PG (ref 26.6–33)
MCHC RBC AUTO-ENTMCNC: 34.4 G/DL (ref 31.5–35.7)
MCV RBC AUTO: 86.3 FL (ref 79–97)
MONOCYTES # BLD AUTO: 0.74 10*3/MM3 (ref 0.1–0.9)
MONOCYTES NFR BLD AUTO: 6 % (ref 5–12)
NEUTROPHILS NFR BLD AUTO: 10.55 10*3/MM3 (ref 1.7–7)
NEUTROPHILS NFR BLD AUTO: 86.2 % (ref 42.7–76)
NRBC BLD AUTO-RTO: 0 /100 WBC (ref 0–0.2)
PLATELET # BLD AUTO: 333 10*3/MM3 (ref 140–450)
PMV BLD AUTO: 11.4 FL (ref 6–12)
POTASSIUM SERPL-SCNC: 3.7 MMOL/L (ref 3.5–5.2)
PROT SERPL-MCNC: 5.9 G/DL (ref 6–8.5)
RBC # BLD AUTO: 5.55 10*6/MM3 (ref 4.14–5.8)
SODIUM SERPL-SCNC: 137 MMOL/L (ref 136–145)
WBC # BLD AUTO: 12.26 10*3/MM3 (ref 3.4–10.8)

## 2021-08-02 PROCEDURE — 83735 ASSAY OF MAGNESIUM: CPT | Performed by: PHYSICIAN ASSISTANT

## 2021-08-02 PROCEDURE — 99291 CRITICAL CARE FIRST HOUR: CPT | Performed by: INTERNAL MEDICINE

## 2021-08-02 PROCEDURE — 85379 FIBRIN DEGRADATION QUANT: CPT | Performed by: PHYSICIAN ASSISTANT

## 2021-08-02 PROCEDURE — 80053 COMPREHEN METABOLIC PANEL: CPT | Performed by: PHYSICIAN ASSISTANT

## 2021-08-02 PROCEDURE — 85025 COMPLETE CBC W/AUTO DIFF WBC: CPT | Performed by: PHYSICIAN ASSISTANT

## 2021-08-02 PROCEDURE — 82248 BILIRUBIN DIRECT: CPT | Performed by: GENERAL PRACTICE

## 2021-08-02 PROCEDURE — 86140 C-REACTIVE PROTEIN: CPT | Performed by: PHYSICIAN ASSISTANT

## 2021-08-02 PROCEDURE — 83615 LACTATE (LD) (LDH) ENZYME: CPT | Performed by: PHYSICIAN ASSISTANT

## 2021-08-02 PROCEDURE — 25010000002 FUROSEMIDE PER 20 MG: Performed by: PHYSICIAN ASSISTANT

## 2021-08-02 PROCEDURE — 25010000002 AZITHROMYCIN PER 500 MG: Performed by: PHYSICIAN ASSISTANT

## 2021-08-02 PROCEDURE — 25010000002 DEXAMETHASONE PER 1 MG

## 2021-08-02 PROCEDURE — 85384 FIBRINOGEN ACTIVITY: CPT | Performed by: PHYSICIAN ASSISTANT

## 2021-08-02 PROCEDURE — 99233 SBSQ HOSP IP/OBS HIGH 50: CPT | Performed by: INTERNAL MEDICINE

## 2021-08-02 PROCEDURE — 25010000002 CEFTRIAXONE PER 250 MG: Performed by: PHYSICIAN ASSISTANT

## 2021-08-02 PROCEDURE — 94799 UNLISTED PULMONARY SVC/PX: CPT

## 2021-08-02 PROCEDURE — 25010000002 DEXAMETHASONE PER 1 MG: Performed by: INTERNAL MEDICINE

## 2021-08-02 PROCEDURE — 25010000002 ENOXAPARIN PER 10 MG: Performed by: INTERNAL MEDICINE

## 2021-08-02 PROCEDURE — 25010000002 DEXAMETHASONE PER 1 MG: Performed by: PHYSICIAN ASSISTANT

## 2021-08-02 PROCEDURE — 82728 ASSAY OF FERRITIN: CPT | Performed by: PHYSICIAN ASSISTANT

## 2021-08-02 PROCEDURE — 82550 ASSAY OF CK (CPK): CPT | Performed by: PHYSICIAN ASSISTANT

## 2021-08-02 PROCEDURE — 36415 COLL VENOUS BLD VENIPUNCTURE: CPT | Performed by: GENERAL PRACTICE

## 2021-08-02 RX ORDER — DEXAMETHASONE SODIUM PHOSPHATE 4 MG/ML
4 INJECTION, SOLUTION INTRA-ARTICULAR; INTRALESIONAL; INTRAMUSCULAR; INTRAVENOUS; SOFT TISSUE ONCE
Status: COMPLETED | OUTPATIENT
Start: 2021-08-02 | End: 2021-08-02

## 2021-08-02 RX ORDER — FUROSEMIDE 10 MG/ML
20 INJECTION INTRAMUSCULAR; INTRAVENOUS DAILY
Status: DISCONTINUED | OUTPATIENT
Start: 2021-08-02 | End: 2021-08-08 | Stop reason: HOSPADM

## 2021-08-02 RX ORDER — DEXAMETHASONE SODIUM PHOSPHATE 10 MG/ML
10 INJECTION INTRAMUSCULAR; INTRAVENOUS 2 TIMES DAILY
Status: DISCONTINUED | OUTPATIENT
Start: 2021-08-02 | End: 2021-08-02

## 2021-08-02 RX ORDER — DEXAMETHASONE SODIUM PHOSPHATE 10 MG/ML
10 INJECTION INTRAMUSCULAR; INTRAVENOUS 2 TIMES DAILY
Status: DISCONTINUED | OUTPATIENT
Start: 2021-08-02 | End: 2021-08-08 | Stop reason: HOSPADM

## 2021-08-02 RX ADMIN — DEXAMETHASONE SODIUM PHOSPHATE 4 MG: 4 INJECTION INTRA-ARTICULAR; INTRALESIONAL; INTRAMUSCULAR; INTRAVENOUS; SOFT TISSUE at 09:27

## 2021-08-02 RX ADMIN — IPRATROPIUM BROMIDE AND ALBUTEROL SULFATE 3 ML: .5; 2.5 SOLUTION RESPIRATORY (INHALATION) at 18:50

## 2021-08-02 RX ADMIN — SODIUM CHLORIDE, PRESERVATIVE FREE 10 ML: 5 INJECTION INTRAVENOUS at 07:59

## 2021-08-02 RX ADMIN — REMDESIVIR 100 MG: 100 INJECTION, POWDER, LYOPHILIZED, FOR SOLUTION INTRAVENOUS at 13:22

## 2021-08-02 RX ADMIN — LEVOTHYROXINE SODIUM 125 MCG: 125 TABLET ORAL at 06:04

## 2021-08-02 RX ADMIN — PANTOPRAZOLE SODIUM 40 MG: 40 TABLET, DELAYED RELEASE ORAL at 06:04

## 2021-08-02 RX ADMIN — AZITHROMYCIN 500 MG: 500 INJECTION, POWDER, LYOPHILIZED, FOR SOLUTION INTRAVENOUS at 08:50

## 2021-08-02 RX ADMIN — FUROSEMIDE 20 MG: 10 INJECTION, SOLUTION INTRAMUSCULAR; INTRAVENOUS at 10:40

## 2021-08-02 RX ADMIN — DEXAMETHASONE SODIUM PHOSPHATE 10 MG: 10 INJECTION INTRAMUSCULAR; INTRAVENOUS at 20:15

## 2021-08-02 RX ADMIN — BUDESONIDE 0.5 MG: 0.5 INHALANT ORAL at 18:50

## 2021-08-02 RX ADMIN — IPRATROPIUM BROMIDE AND ALBUTEROL SULFATE 3 ML: .5; 2.5 SOLUTION RESPIRATORY (INHALATION) at 12:55

## 2021-08-02 RX ADMIN — ARFORMOTEROL TARTRATE 15 MCG: 15 SOLUTION RESPIRATORY (INHALATION) at 18:50

## 2021-08-02 RX ADMIN — ENOXAPARIN SODIUM 40 MG: 40 INJECTION SUBCUTANEOUS at 10:41

## 2021-08-02 RX ADMIN — CEFTRIAXONE 1 G: 10 INJECTION, POWDER, FOR SOLUTION INTRAVENOUS at 08:00

## 2021-08-02 RX ADMIN — ARFORMOTEROL TARTRATE 15 MCG: 15 SOLUTION RESPIRATORY (INHALATION) at 07:01

## 2021-08-02 RX ADMIN — IPRATROPIUM BROMIDE AND ALBUTEROL SULFATE 3 ML: .5; 2.5 SOLUTION RESPIRATORY (INHALATION) at 07:01

## 2021-08-02 RX ADMIN — DEXAMETHASONE SODIUM PHOSPHATE 6 MG: 10 INJECTION INTRAMUSCULAR; INTRAVENOUS at 08:49

## 2021-08-02 RX ADMIN — BUDESONIDE 0.5 MG: 0.5 INHALANT ORAL at 07:01

## 2021-08-02 NOTE — PLAN OF CARE
Goal Outcome Evaluation:  Plan of Care Reviewed With: patient        Progress: improving  Outcome Summary: Patient remains on airvo at 60L and 100%.  Patient reports feelling some better.  Patient tolerating diet.

## 2021-08-02 NOTE — SIGNIFICANT NOTE
08/02/21 1247   Spiritual Care   Spiritual Care Visit Type initial  (introductions made and my role explained )   Spiritual Care Source  initiative   Receptivity to Spiritual Care visit welcomed   Spiritual Care Request coping/stress of illness support  (multiple fam members diagnosed with covid )   Spiritual Care Interventions supportive conversation provided   Response to Spiritual Care receptive of support   Spiritual Care Follow-Up follow-up planned regularly for general support     At time of visit pt is in CCU in covid iso.

## 2021-08-02 NOTE — PROGRESS NOTES
[facility]     Progress Note    Patient Name: Antonio Persaud  : 1970  MRN: [id number]  Primary Care Physician:  Debora Leonardo APRN  Date of admission: 2021    Subjective   Subjective     52y/o male critically ill in CCU with COVID-19     Date of Admission: 2021  CCU day: 2   Oxygen requirement: Airvo 60 L 100%  Sedation: None  Pressors: None  Critical drips: None  Antibiotic regimen: Ceftriaxone azithromycin  Lines and insertion date: Peripherals     COVID-19 treatment:  Decadron with planning for 10-day course  Remdesivir clinically 5-day course  Actemra x2     This morning....  Patient is awake and alert.  Still has shortness of breath with minimal exertion.  Had chest tightness.  Has cough, some sputum production.  On max Airvo.  Was on BiPAP overnight.  Ferritin and CRP trending down      ROS:  General: Fatigue, otherwise denied complaints  HEENT: Denied complaints  Respiratory: Dyspnea, otherwise denied complaints  Cardiovascular: Denied complaints  GI: Denied complaints  MSK: Denied complaints      Objective   Objective     Vitals:   Temp:  [97.9 °F (36.6 °C)-98.3 °F (36.8 °C)] 98 °F (36.7 °C)  Heart Rate:  [60-98] 66  Resp:  [18-32] 24  BP: (111-189)/() 112/81  Flow (L/min):  [60] 60    Physical Exam   Vital Signs Reviewed  General ill-appearing male lying in the prone position  HEENT:  PERRL, EOMI.  OP, nares clear, no sinus tenderness on air Vo  Neck:  Supple, no JVD, no thyromegaly  Chest: Auscultation reveals coarse crackles throughout the posterior lung fields, conversational dyspnea+  CV: RRR, no MGR, pulses 2+, equal.  Abd:  Soft, NT, ND, + BS, no HSM  EXT:  no clubbing, no cyanosis, no edema  Neuro:  A&Ox3, CN grossly intact, no focal deficits  Skin: No rashes or lesions noted    Result Review    Result Review:  I have personally reviewed the results from the time of this admission to 2021 11:31 EDT and agree with these findings:  [x]  Laboratory  [x]   Microbiology  [x]  Radiology  [x]  EKG/Telemetry   [x]  Cardiology/Vascular   []  Pathology  [x]  Old records  []  Other:  Most notable findings include: Chest x-ray showing multifocal pneumonia    Assessment/Plan   Assessment / Plan     Brief Patient Summary:  Antonio Persaud is a 51 y.o. male who critically ill in intensive care unit with ARDS secondary to COVID-19    Active Hospital Problems:  Active Hospital Problems    Diagnosis    • Acute hypoxemic respiratory failure due to COVID-19 (CMS/[facility])    • Acute respiratory distress syndrome (ARDS) due to 2019 novel coronavirus (CMS/[facility])    • Pneumonia due to COVID-19 virus    • Hyponatremia    • Acute kidney injury (CMS/[facility])    • Transaminitis    • Pneumonia of both lungs due to infectious organism    • Hypothyroidism    • Acute respiratory failure with hypoxia (CMS/[facility])      Plan:   Continue supplemental O2 as needed keep O2 saturations greater than 90%  Continue breathing treatments and BPH protocol  Continue to encourage self proning  Trend COVID-19 inflammatory [id number], CRP and ferritin every 48 hours  Continue Decadron with plan to complete 10-day course, increase to 10 mg IV BID  Continue remdesivir with plan to complete 5-day course  Has received actemra x 2  Start lasix 20 mg IV once daily  Johns catheter to maintain accurate I's and O's  Trend renal panel, LFTs and electrolytes  Will replace electrolytes as needed     DVT prophylaxis:  Medical DVT prophylaxis orders are present.    CODE STATUS:    Code Status: CPR  Medical Interventions (Level of Support Prior to Arrest): Full    The above plan is a product of multidisciplinary rounds performed this morning on 8/2/2021 at bedside with the critical care team consisting of the on-call physician, Dr. Cheng,  and myself Yanira Fierro PA-C, bedside RN, pharmacist, respiratory therapist, dietitian and other Allied health services.    Dictated utilizing Dragon Dictation.      Electronically signed by Yanira  KWASI Smith 08/02/21 11:16 EDT    ====================================================    Mr. Persaud is 51 years old male with COVID-19 infection, viral pneumonia, ARDS, respiratory failure.  We will increase the Decadron dose to 10 mg twice daily.  Continue with remdesivir to complete 5 days course.  He has already received 2 doses of Tocilizumab.  Start with Lasix 20 mg once daily, keep negative balance.  Air vo and bipap intermittently.  Has tenuous respiratory status and overall poor prognosis.  Continues to need ICU care.  Self proning during daytime.  Monitor inflammatory markers and renal and liver functions.    Critical care time spent 33 minutes excluding any procedure.

## 2021-08-02 NOTE — PROGRESS NOTES
[facility]   Hospitalist Progress Note  Date: 2021  Patient Name: Antonio Persaud  : 1970  MRN: [id number]  Date of admission: 2021      Subjective   Subjective     Chief Complaint:   Shortness of breath    Summary:   Antonio Persaud 51-year-old gentleman with a past medical history significant for Graves' disease.  Patient presented to the hospital on 2021 with chief complaint of shortness of breath.  Patient had tested positive for Covid around 1 week ago, patient was not vaccinated.  Patient was quickly taken to the ICU given his oxygen needs.  Patient has been placed on air Vo maxed out with nonrebreather over place.  Patient has been weaned down to air Vo alone 60 L 100%.  Patient appears much more comfortable on rounds.    Interval Followup:   Patient remains on 60 L 100%.  Patient continues to lay prone at night, rocking from side to side during the day.  Patient uses incentive spirometer and flutter valve is much as possible.  Patient states his breathing symptomatically feels better.    Review of Systems   All systems were reviewed and negative except for: Improved dyspnea, anxiety    Objective   Objective     Vitals:   Temp:  [97.8 °F (36.6 °C)-98.3 °F (36.8 °C)] 98 °F (36.7 °C)  Heart Rate:  [] 102  Resp:  [18-32] 22  BP: (111-163)/() 146/89  Flow (L/min):  [60] 60  Physical Exam    Constitutional: Awake, alert, increased work of breathing   Eyes: Pupils equal, sclerae anicteric, no conjunctival injection   HENT: NCAT, mucous membranes moist   Respiratory: Diminished breath sounds bilaterally, crackles bilaterally   Cardiovascular: RRR, no murmurs, rubs, or gallops, palpable pedal pulses bilaterally   Gastrointestinal: Positive bowel sounds, soft, nontender, nondistended   Musculoskeletal: No bilateral ankle edema, no clubbing or cyanosis to extremities   Psychiatric: Appropriate affect, cooperative   Neurologic: Oriented x 3, strength symmetric in all extremities,  Cranial Nerves grossly intact to confrontation, speech clear   Skin: No rashes     Result Review    Result Review:  I have personally reviewed the results from the time of this admission to 8/2/2021 18:43 EDT and agree with these findings:  [x]  Laboratory  [x]  Microbiology  [x]  Radiology  [x]  EKG/Telemetry   [x]  Cardiology/Vascular   []  Pathology  []  Old records  []  Other:    Assessment/Plan   Assessment / Plan     Assessment/Plan:  Covid 19 pneumonia  Acute hypoxic respiratory failure  Lactic acidosis, resolved  JEFE, resolved    Plan:  Patient remains admitted to the ICU for further care management  Consulting pulmonary critical care, appreciate assistance  Patient has been started on remdesivir, therapeutic drug monitoring  Continue CAP coverage antibiotics  Scheduled and as needed breathing treatments  Increasing dexamethasone to 10 mg twice daily  Scheduled diuretics twice daily  She received Actemra x2  Continue home Synthroid    Discussed plan with RN, pulmonologist    DVT prophylaxis:  Medical DVT prophylaxis orders are present.    CODE STATUS:   Code Status: CPR  Medical Interventions (Level of Support Prior to Arrest): Full    Electronically signed by Eulogio Osorio MD, 08/02/21, 6:46 PM EDT.

## 2021-08-02 NOTE — PLAN OF CARE
Goal Outcome Evaluation:         Patient has not worn BiPAP this admission. Order still stands if needed. Patient currently stable on AIRVO.

## 2021-08-02 NOTE — CONSULTS
"Nutrition Services    Patient Name: Antonio Persaud  YOB: 1970  MRN: [id number]  Admission date: 7/30/2021      Clinical Nutrition Assessment      Reason for Assessment: Reduced oral intake       Clinical Course/Narrative: Patient reviewed during rounds. 25% po intake. Increased energy requirements related to COVID-19 infection. Would benefit from oral nutrition supplement. Will order Boost Very High Calorie TID and continue to monitor and follow per protocol.            Past Medical History:   Diagnosis Date   • Graves disease        History reviewed. No pertinent surgical history.       Nutrition Intervention:        PO Diet/Supplements: Diet Regular   No active supplement orders    Patient isn't on Tube Feeding       Intake/Output:   Intake/Output Summary (Last 24 hours) at 8/2/2021 1203  Last data filed at 8/2/2021 1000  Gross per 24 hour   Intake 780 ml   Output 600 ml   Net 180 ml            Height: Height: 177.8 cm (70\")   Weight: Weight: 111 kg (244 lb 14.9 oz) (07/31/21 0040)   BMI: Body mass index is 35.14 kg/m².     Estimated/Assessed Needs       Energy Requirements    EST Needs (kcal/day) 6591-9357 kcal        Protein Requirements    EST Daily Needs (g/day) 124-206 g protein       Fluid Requirements     Estimated Needs (mL/day) 3703-4863 ml fluid      Results from last 7 days   Lab Units 08/02/21 0321 08/01/21 0309 07/31/21  0410   SODIUM mmol/L 137 135* 132*   POTASSIUM mmol/L 3.7 3.9 4.4   CHLORIDE mmol/L 102 99 95*   CO2 mmol/L 21.2* 26.5 23.2   BUN mg/dL 18 21* 19   CREATININE mg/dL 0.88 1.08 0.96   CALCIUM mg/dL 8.6 8.8 8.2*   BILIRUBIN mg/dL 0.6 0.6 0.7   ALK PHOS U/L 87 87 86   ALT (SGPT) U/L 84* 95* 97*   AST (SGOT) U/L 63* 93* 157*   GLUCOSE mg/dL 109* 129* 131*     Results from last 7 days   Lab Units 08/02/21  0321 08/01/21  0309 08/01/21  0309 07/31/21  0410 07/31/21  0410   MAGNESIUM mg/dL 2.2  --  2.3  --  2.2   HEMOGLOBIN g/dL 16.5   < > 16.8   < > 17.2   HEMATOCRIT % " 47.9   < > 48.8   < > 49.5    < > = values in this interval not displayed.     No results found for: HGBA1C    Nutrition Diagnosis         Nutrition Dx Problem 1 Increased nutrient needs related to increased nutrient needs due to catabolic disease as evidenced by COVID-19 infection.       Nutrition Intervention         Boost Very High Calorie TID (+1590 kcal, 66 g protein daily).      Monitor/Evaluation        Monitor Monitor/Eval: Per protocol, I&O, PO intake, Supplement intake, Pertinent labs, Weight, POC/GOC       RD to follow up per protocol.    Electronically signed by:  Nyla Huynh RD  08/02/21 12:03 EDT

## 2021-08-03 LAB
ALBUMIN SERPL-MCNC: 3 G/DL (ref 3.5–5.2)
ALBUMIN/GLOB SERPL: 1.1 G/DL
ALP SERPL-CCNC: 101 U/L (ref 39–117)
ALT SERPL W P-5'-P-CCNC: 97 U/L (ref 1–41)
ANION GAP SERPL CALCULATED.3IONS-SCNC: 10.9 MMOL/L (ref 5–15)
AST SERPL-CCNC: 69 U/L (ref 1–40)
BASOPHILS # BLD AUTO: 0.04 10*3/MM3 (ref 0–0.2)
BASOPHILS NFR BLD AUTO: 0.4 % (ref 0–1.5)
BILIRUB CONJ SERPL-MCNC: 0.2 MG/DL (ref 0–0.3)
BILIRUB SERPL-MCNC: 0.7 MG/DL (ref 0–1.2)
BUN SERPL-MCNC: 19 MG/DL (ref 6–20)
BUN/CREAT SERPL: 21.3 (ref 7–25)
CALCIUM SPEC-SCNC: 8.7 MG/DL (ref 8.6–10.5)
CHLORIDE SERPL-SCNC: 101 MMOL/L (ref 98–107)
CK SERPL-CCNC: 122 U/L (ref 20–200)
CO2 SERPL-SCNC: 24.1 MMOL/L (ref 22–29)
CREAT SERPL-MCNC: 0.89 MG/DL (ref 0.76–1.27)
CRP SERPL-MCNC: 1.45 MG/DL (ref 0–0.5)
DEPRECATED RDW RBC AUTO: 41.2 FL (ref 37–54)
EOSINOPHIL # BLD AUTO: 0 10*3/MM3 (ref 0–0.4)
EOSINOPHIL NFR BLD AUTO: 0 % (ref 0.3–6.2)
ERYTHROCYTE [DISTWIDTH] IN BLOOD BY AUTOMATED COUNT: 13.2 % (ref 12.3–15.4)
FERRITIN SERPL-MCNC: 1940 NG/ML (ref 30–400)
GFR SERPL CREATININE-BSD FRML MDRD: 89 ML/MIN/1.73
GLOBULIN UR ELPH-MCNC: 2.8 GM/DL
GLUCOSE SERPL-MCNC: 129 MG/DL (ref 65–99)
HCT VFR BLD AUTO: 50 % (ref 37.5–51)
HGB BLD-MCNC: 16.9 G/DL (ref 13–17.7)
IMM GRANULOCYTES # BLD AUTO: 0.27 10*3/MM3 (ref 0–0.05)
IMM GRANULOCYTES NFR BLD AUTO: 2.7 % (ref 0–0.5)
LYMPHOCYTES # BLD AUTO: 0.46 10*3/MM3 (ref 0.7–3.1)
LYMPHOCYTES NFR BLD AUTO: 4.6 % (ref 19.6–45.3)
MAGNESIUM SERPL-MCNC: 2.1 MG/DL (ref 1.6–2.6)
MCH RBC QN AUTO: 29.1 PG (ref 26.6–33)
MCHC RBC AUTO-ENTMCNC: 33.8 G/DL (ref 31.5–35.7)
MCV RBC AUTO: 86.2 FL (ref 79–97)
MONOCYTES # BLD AUTO: 0.44 10*3/MM3 (ref 0.1–0.9)
MONOCYTES NFR BLD AUTO: 4.4 % (ref 5–12)
NEUTROPHILS NFR BLD AUTO: 8.73 10*3/MM3 (ref 1.7–7)
NEUTROPHILS NFR BLD AUTO: 87.9 % (ref 42.7–76)
NRBC BLD AUTO-RTO: 0 /100 WBC (ref 0–0.2)
NT-PROBNP SERPL-MCNC: 119.1 PG/ML (ref 0–900)
PHOSPHATE SERPL-MCNC: 3.6 MG/DL (ref 2.5–4.5)
PLATELET # BLD AUTO: 298 10*3/MM3 (ref 140–450)
PMV BLD AUTO: 11 FL (ref 6–12)
POTASSIUM SERPL-SCNC: 3.9 MMOL/L (ref 3.5–5.2)
PROT SERPL-MCNC: 5.8 G/DL (ref 6–8.5)
RBC # BLD AUTO: 5.8 10*6/MM3 (ref 4.14–5.8)
SODIUM SERPL-SCNC: 136 MMOL/L (ref 136–145)
WBC # BLD AUTO: 9.94 10*3/MM3 (ref 3.4–10.8)

## 2021-08-03 PROCEDURE — 86140 C-REACTIVE PROTEIN: CPT | Performed by: PHYSICIAN ASSISTANT

## 2021-08-03 PROCEDURE — 99233 SBSQ HOSP IP/OBS HIGH 50: CPT | Performed by: STUDENT IN AN ORGANIZED HEALTH CARE EDUCATION/TRAINING PROGRAM

## 2021-08-03 PROCEDURE — 25010000002 DEXAMETHASONE PER 1 MG

## 2021-08-03 PROCEDURE — 94799 UNLISTED PULMONARY SVC/PX: CPT

## 2021-08-03 PROCEDURE — 83735 ASSAY OF MAGNESIUM: CPT | Performed by: PHYSICIAN ASSISTANT

## 2021-08-03 PROCEDURE — 82550 ASSAY OF CK (CPK): CPT | Performed by: PHYSICIAN ASSISTANT

## 2021-08-03 PROCEDURE — 25010000002 AZITHROMYCIN PER 500 MG: Performed by: PHYSICIAN ASSISTANT

## 2021-08-03 PROCEDURE — 25010000002 ENOXAPARIN PER 10 MG: Performed by: INTERNAL MEDICINE

## 2021-08-03 PROCEDURE — 82728 ASSAY OF FERRITIN: CPT | Performed by: PHYSICIAN ASSISTANT

## 2021-08-03 PROCEDURE — 85025 COMPLETE CBC W/AUTO DIFF WBC: CPT | Performed by: PHYSICIAN ASSISTANT

## 2021-08-03 PROCEDURE — 84100 ASSAY OF PHOSPHORUS: CPT | Performed by: PHYSICIAN ASSISTANT

## 2021-08-03 PROCEDURE — 25010000002 FUROSEMIDE PER 20 MG: Performed by: PHYSICIAN ASSISTANT

## 2021-08-03 PROCEDURE — 25010000002 CEFTRIAXONE PER 250 MG: Performed by: PHYSICIAN ASSISTANT

## 2021-08-03 PROCEDURE — 80053 COMPREHEN METABOLIC PANEL: CPT | Performed by: PHYSICIAN ASSISTANT

## 2021-08-03 PROCEDURE — 83880 ASSAY OF NATRIURETIC PEPTIDE: CPT | Performed by: PHYSICIAN ASSISTANT

## 2021-08-03 PROCEDURE — 99291 CRITICAL CARE FIRST HOUR: CPT | Performed by: INTERNAL MEDICINE

## 2021-08-03 PROCEDURE — 82248 BILIRUBIN DIRECT: CPT | Performed by: GENERAL PRACTICE

## 2021-08-03 RX ORDER — BENZONATATE 100 MG/1
100 CAPSULE ORAL 3 TIMES DAILY PRN
Status: DISCONTINUED | OUTPATIENT
Start: 2021-08-03 | End: 2021-08-08 | Stop reason: HOSPADM

## 2021-08-03 RX ORDER — GUAIFENESIN AND CODEINE PHOSPHATE 100; 10 MG/5ML; MG/5ML
5 SOLUTION ORAL ONCE
Status: DISCONTINUED | OUTPATIENT
Start: 2021-08-03 | End: 2021-08-03

## 2021-08-03 RX ADMIN — BUDESONIDE 0.5 MG: 0.5 INHALANT ORAL at 07:14

## 2021-08-03 RX ADMIN — DEXAMETHASONE SODIUM PHOSPHATE 10 MG: 10 INJECTION INTRAMUSCULAR; INTRAVENOUS at 08:20

## 2021-08-03 RX ADMIN — PANTOPRAZOLE SODIUM 40 MG: 40 TABLET, DELAYED RELEASE ORAL at 06:10

## 2021-08-03 RX ADMIN — IPRATROPIUM BROMIDE AND ALBUTEROL SULFATE 3 ML: .5; 2.5 SOLUTION RESPIRATORY (INHALATION) at 12:52

## 2021-08-03 RX ADMIN — ENOXAPARIN SODIUM 40 MG: 40 INJECTION SUBCUTANEOUS at 11:42

## 2021-08-03 RX ADMIN — ARFORMOTEROL TARTRATE 15 MCG: 15 SOLUTION RESPIRATORY (INHALATION) at 07:15

## 2021-08-03 RX ADMIN — IPRATROPIUM BROMIDE AND ALBUTEROL SULFATE 3 ML: .5; 2.5 SOLUTION RESPIRATORY (INHALATION) at 19:48

## 2021-08-03 RX ADMIN — DEXAMETHASONE SODIUM PHOSPHATE 10 MG: 10 INJECTION INTRAMUSCULAR; INTRAVENOUS at 20:10

## 2021-08-03 RX ADMIN — LEVOTHYROXINE SODIUM 125 MCG: 125 TABLET ORAL at 06:10

## 2021-08-03 RX ADMIN — FUROSEMIDE 20 MG: 10 INJECTION, SOLUTION INTRAMUSCULAR; INTRAVENOUS at 08:20

## 2021-08-03 RX ADMIN — ARFORMOTEROL TARTRATE 15 MCG: 15 SOLUTION RESPIRATORY (INHALATION) at 19:49

## 2021-08-03 RX ADMIN — BENZONATATE 100 MG: 100 CAPSULE ORAL at 17:27

## 2021-08-03 RX ADMIN — BUDESONIDE 0.5 MG: 0.5 INHALANT ORAL at 19:49

## 2021-08-03 RX ADMIN — AZITHROMYCIN 500 MG: 500 INJECTION, POWDER, LYOPHILIZED, FOR SOLUTION INTRAVENOUS at 08:20

## 2021-08-03 RX ADMIN — HYDROCODONE POLISTIREX AND CHLORPHENIRAMINE POLISTIREX 5 ML: 10; 8 SUSPENSION, EXTENDED RELEASE ORAL at 22:51

## 2021-08-03 RX ADMIN — IPRATROPIUM BROMIDE AND ALBUTEROL SULFATE 3 ML: .5; 2.5 SOLUTION RESPIRATORY (INHALATION) at 07:15

## 2021-08-03 RX ADMIN — REMDESIVIR 100 MG: 100 INJECTION, POWDER, LYOPHILIZED, FOR SOLUTION INTRAVENOUS at 13:29

## 2021-08-03 RX ADMIN — CEFTRIAXONE 1 G: 10 INJECTION, POWDER, FOR SOLUTION INTRAVENOUS at 08:20

## 2021-08-03 NOTE — PLAN OF CARE
Goal Outcome Evaluation: Patient remained on Airvo 60L 85%. Bed bath and linen change complete. Patient tolerated well. John Quan RN

## 2021-08-03 NOTE — PROGRESS NOTES
[facility]     Progress Note    Patient Name: Antonio Persaud  : 1970  MRN: [id number]  Primary Care Physician:  Debora Leonardo APRN  Date of admission: 2021    Subjective   Subjective     52y/o male critically ill in CCU with COVID-19     Date of Admission: 2021  CCU day: 3  Oxygen requirement: Airvo 60 L 80%  Sedation: None  Pressors: None  Critical drips: None  Antibiotic regimen: Ceftriaxone azithromycin  Lines and insertion date: Peripherals     COVID-19 treatment:  Decadron with planning for 10-day course  Remdesivir to complete 5-day course  Actemra x2     This morning....  Pt is awake and alert  Still significant SOA with exertion such as using the bathroom  Does not appear to have significant conversational dyspnea  Has been weaned down from 100 to 80% on Airvo  Wearing BIPAP at night      ROS:  General: Fatigue, otherwise denied complaints  HEENT: Denied complaints  Respiratory: Dyspnea, otherwise denied complaints  Cardiovascular: Denied complaints  GI: Denied complaints  MSK: Denied complaints      Objective   Objective     Vitals:   Temp:  [97.7 °F (36.5 °C)-98 °F (36.7 °C)] 97.7 °F (36.5 °C)  Heart Rate:  [] 92  Resp:  [20-26] 23  BP: (111-155)/() 148/93  Flow (L/min):  [60] 60    Physical Exam   Vital Signs Reviewed  General ill-appearing male, awake and alert  HEENT:  PERRL, EOMI.  OP, nares clear, on air Vo  Neck:  Supple, no JVD, no thyromegaly  Chest: Auscultation reveals coarse crackles throughout the posterior lung fields, conversational dyspnea seems improved today compared to yesterday  CV: RRR, no MGR, pulses 2+, equal.  Abd:  Soft, NT, ND, + BS, no HSM  EXT:  no clubbing, no cyanosis, no edema  Neuro:  A&Ox3, CN grossly intact, no focal deficits  Skin: No rashes or lesions noted    Result Review    Result Review:  I have personally reviewed the results from the time of this admission to 8/3/2021 08:01 EDT and agree with these findings:  [x]   Laboratory  [x]  Microbiology  [x]  Radiology  [x]  EKG/Telemetry   [x]  Cardiology/Vascular   []  Pathology  [x]  Old records  []  Other:  Most notable findings include: Chest x-ray showing multifocal pneumonia    Assessment/Plan   Assessment / Plan     Brief Patient Summary:  Antonio Persaud is a 51 y.o. male who critically ill in intensive care unit with ARDS secondary to COVID-19    Active Hospital Problems:  Active Hospital Problems    Diagnosis    • Acute hypoxemic respiratory failure due to COVID-19 (CMS/[facility])    • Acute respiratory distress syndrome (ARDS) due to 2019 novel coronavirus (CMS/[facility])    • Pneumonia due to COVID-19 virus    • Hyponatremia    • Acute kidney injury (CMS/[facility])    • Transaminitis    • Pneumonia of both lungs due to infectious organism    • Hypothyroidism    • Acute respiratory failure with hypoxia (CMS/[facility])        Plan:   Continue supplemental O2 as needed keep O2 saturations greater than 88%  Continue breathing treatments and BPH protocol  Continue to encourage self proning  Continue to trend COVID-19 inflammatory [id number], CRP and ferritin every 48 hours  Continue Decadron with plan to complete 10-day course  Continue remdesivir with plan to complete 5-day course  Has received actemra x 2  Continue diuresis w/ lasix 20 mg IV once daily  Johns catheter to maintain accurate I's and O's  Trend renal panel, LFTs and electrolytes  Will replace electrolytes as needed     DVT prophylaxis:  Medical DVT prophylaxis orders are present.    CODE STATUS:    Code Status: CPR  Medical Interventions (Level of Support Prior to Arrest): Full    The above plan is a product of multidisciplinary rounds performed this morning on 8/3/2021 at bedside with the critical care team consisting of the on-call physician, Dr. Cheng,  and myself Yanira Fierro PA-C, bedside RN, pharmacist, respiratory therapist, dietitian and other Allied health services.    Dictated utilizing Dragon  Dictation.      Electronically signed by KWASI Herron 08/03/21 10:59 EDT      ====================================================    Mr. Persaud is 51 years old male with COVID-19 infection, viral pneumonia, ARDS, respiratory failure.    Continue with increased Decadron dose 10 mg twice daily.  Continue with remdesivir to complete 5 days course.  He has already received 2 doses of Tocilizumab. Continue with Lasix 20 mg once daily, keep negative balance.  Air vo and bipap intermittently.  Has tenuous respiratory status and overall poor prognosis.  Continues to need ICU care.  Self proning during daytime.  Monitor inflammatory markers and renal and liver functions. Minimally improved compared to yesterday.    Critical care time spent 33 minutes excluding any procedure.

## 2021-08-03 NOTE — PROGRESS NOTES
[facility]   Hospitalist Progress Note  Date: 8/3/2021  Patient Name: Antonio Persaud  : 1970  MRN: [id number]  Date of admission: 2021      Subjective   Subjective     Chief Complaint:   Shortness of breath    Summary:   Antonio Persaud 51-year-old gentleman with a past medical history significant for Graves' disease.  Patient presented to the hospital on 2021 with chief complaint of shortness of breath.  Patient had tested positive for Covid around 1 week ago, patient was not vaccinated.  Patient was quickly taken to the ICU given his oxygen needs.  Patient has been placed on air Vo maxed out with nonrebreather over place.  Patient has been weaned down to air Vo alone 60 L 100%.  Patient appears much more comfortable on rounds.    Interval Followup: No events overnight.  Patient states he is doing well overall.  He still becomes dyspneic with minimal exertion.  He says he is urinating without difficulty.  He says he is tolerating oral intake without any nausea or emesis.  He says he does not have any current chest pain, chest pressure, or palpitations.  He is tolerating some weaning of air Vo.  He is compliant with BiPAP.      Review of Systems   All systems were reviewed and negative except for: Improved dyspnea, anxiety    Objective   Objective     Vitals:   Temp:  [97.7 °F (36.5 °C)-98 °F (36.7 °C)] 97.7 °F (36.5 °C)  Heart Rate:  [] 92  Resp:  [20-26] 23  BP: (111-155)/() 148/93  Flow (L/min):  [60] 60  Physical Exam    Constitutional: Awake, alert, increased work of breathing   Eyes: Pupils equal, sclerae anicteric, no conjunctival injection   HENT: NCAT, mucous membranes moist   Respiratory: Diminished breath sounds bilaterally, crackles bilaterally, on Airvo   Cardiovascular: RRR, no murmurs, rubs, or gallops, palpable pedal pulses bilaterally   Gastrointestinal: Positive bowel sounds, soft, nontender, nondistended   Musculoskeletal: No bilateral ankle edema, no clubbing or  cyanosis to extremities   Psychiatric: Appropriate affect, cooperative   Neurologic: Oriented x 3, strength symmetric in all extremities, Cranial Nerves grossly intact to confrontation, speech clear   Skin: No rashes     Result Review    Result Review:  I have personally reviewed the results from the time of this admission to 8/3/2021 07:21 EDT and agree with these findings:  [x]  Laboratory  [x]  Microbiology  [x]  Radiology  [x]  EKG/Telemetry   [x]  Cardiology/Vascular   []  Pathology  []  Old records  []  Other:    Assessment/Plan   Assessment / Plan     Assessment/Plan:  Covid 19 pneumonia  Acute hypoxic respiratory failure   ARDS  Lactic acidosis, resolved  Therapeutic drug monitoring while on remdesivir  Acute renal failure    Multifocal pneumonia  Transaminitis    Plan:  Patient remains admitted to the ICU for further care management  Consulting pulmonary critical care, appreciate assistance  Continue with remdesivir for a total of 5 days  Continue with Decadron for total of 10 days  Continue empiric antibiotic coverage de-escalate when appropriate  Scheduled and as needed breathing treatments  Continue with dexamethasone to 10 mg twice daily  Scheduled diuretics twice daily  Status post Actemra x2  Continue with appropriate home medication regimen  Added PT/OT  Case management for discharge planning    Discussed plan with RN, pulmonologist    DVT prophylaxis:  Medical DVT prophylaxis orders are present.    CODE STATUS:   Code Status: CPR  Medical Interventions (Level of Support Prior to Arrest): Full    Electronically signed by Alec Ta DO, 08/03/21, 7:22 AM EDT.

## 2021-08-04 ENCOUNTER — APPOINTMENT (OUTPATIENT)
Dept: GENERAL RADIOLOGY | Facility: HOSPITAL | Age: 51
End: 2021-08-04

## 2021-08-04 LAB
ALBUMIN SERPL-MCNC: 3.2 G/DL (ref 3.5–5.2)
ALBUMIN/GLOB SERPL: 1.3 G/DL
ALP SERPL-CCNC: 102 U/L (ref 39–117)
ALT SERPL W P-5'-P-CCNC: 95 U/L (ref 1–41)
ANION GAP SERPL CALCULATED.3IONS-SCNC: 10.6 MMOL/L (ref 5–15)
AST SERPL-CCNC: 57 U/L (ref 1–40)
BACTERIA SPEC AEROBE CULT: NORMAL
BACTERIA SPEC AEROBE CULT: NORMAL
BILIRUB CONJ SERPL-MCNC: 0.2 MG/DL (ref 0–0.3)
BILIRUB SERPL-MCNC: 0.6 MG/DL (ref 0–1.2)
BUN SERPL-MCNC: 20 MG/DL (ref 6–20)
BUN/CREAT SERPL: 20.8 (ref 7–25)
CALCIUM SPEC-SCNC: 8.6 MG/DL (ref 8.6–10.5)
CHLORIDE SERPL-SCNC: 103 MMOL/L (ref 98–107)
CK SERPL-CCNC: 144 U/L (ref 20–200)
CO2 SERPL-SCNC: 26.4 MMOL/L (ref 22–29)
CREAT SERPL-MCNC: 0.96 MG/DL (ref 0.76–1.27)
CRP SERPL-MCNC: 0.81 MG/DL (ref 0–0.5)
D-DIMER HIGH CURVE: 15.65 MG/L (FEU) (ref 0–0.59)
DEPRECATED RDW RBC AUTO: 40.7 FL (ref 37–54)
ERYTHROCYTE [DISTWIDTH] IN BLOOD BY AUTOMATED COUNT: 13.2 % (ref 12.3–15.4)
FERRITIN SERPL-MCNC: 1766 NG/ML (ref 30–400)
FIBRINOGEN PPP-MCNC: 217 MG/DL (ref 200–450)
GFR SERPL CREATININE-BSD FRML MDRD: 82 ML/MIN/1.73
GLOBULIN UR ELPH-MCNC: 2.4 GM/DL
GLUCOSE SERPL-MCNC: 150 MG/DL (ref 65–99)
HCT VFR BLD AUTO: 49.6 % (ref 37.5–51)
HGB BLD-MCNC: 16.8 G/DL (ref 13–17.7)
LDH SERPL-CCNC: 680 U/L (ref 135–225)
LYMPHOCYTES # BLD MANUAL: 0.87 10*3/MM3 (ref 0.7–3.1)
LYMPHOCYTES NFR BLD MANUAL: 5 % (ref 5–12)
LYMPHOCYTES NFR BLD MANUAL: 7 % (ref 19.6–45.3)
MAGNESIUM SERPL-MCNC: 2.2 MG/DL (ref 1.6–2.6)
MCH RBC QN AUTO: 29.1 PG (ref 26.6–33)
MCHC RBC AUTO-ENTMCNC: 33.9 G/DL (ref 31.5–35.7)
MCV RBC AUTO: 86 FL (ref 79–97)
METAMYELOCYTES NFR BLD MANUAL: 1 % (ref 0–0)
MONOCYTES # BLD AUTO: 0.62 10*3/MM3 (ref 0.1–0.9)
NEUTROPHILS # BLD AUTO: 10.86 10*3/MM3 (ref 1.7–7)
NEUTROPHILS NFR BLD MANUAL: 85 % (ref 42.7–76)
NEUTS BAND NFR BLD MANUAL: 2 % (ref 0–5)
PHOSPHATE SERPL-MCNC: 4.2 MG/DL (ref 2.5–4.5)
PLAT MORPH BLD: NORMAL
PLATELET # BLD AUTO: 239 10*3/MM3 (ref 140–450)
PMV BLD AUTO: 10.6 FL (ref 6–12)
POTASSIUM SERPL-SCNC: 4.3 MMOL/L (ref 3.5–5.2)
PROT SERPL-MCNC: 5.6 G/DL (ref 6–8.5)
RBC # BLD AUTO: 5.77 10*6/MM3 (ref 4.14–5.8)
RBC MORPH BLD: NORMAL
SCAN SLIDE: NORMAL
SODIUM SERPL-SCNC: 140 MMOL/L (ref 136–145)
WBC # BLD AUTO: 12.48 10*3/MM3 (ref 3.4–10.8)
WBC MORPH BLD: NORMAL

## 2021-08-04 PROCEDURE — 71045 X-RAY EXAM CHEST 1 VIEW: CPT

## 2021-08-04 PROCEDURE — 25010000002 AZITHROMYCIN PER 500 MG: Performed by: PHYSICIAN ASSISTANT

## 2021-08-04 PROCEDURE — 82248 BILIRUBIN DIRECT: CPT | Performed by: GENERAL PRACTICE

## 2021-08-04 PROCEDURE — 84100 ASSAY OF PHOSPHORUS: CPT | Performed by: INTERNAL MEDICINE

## 2021-08-04 PROCEDURE — 85379 FIBRIN DEGRADATION QUANT: CPT | Performed by: PHYSICIAN ASSISTANT

## 2021-08-04 PROCEDURE — 85025 COMPLETE CBC W/AUTO DIFF WBC: CPT | Performed by: PHYSICIAN ASSISTANT

## 2021-08-04 PROCEDURE — 80053 COMPREHEN METABOLIC PANEL: CPT | Performed by: PHYSICIAN ASSISTANT

## 2021-08-04 PROCEDURE — 83735 ASSAY OF MAGNESIUM: CPT | Performed by: PHYSICIAN ASSISTANT

## 2021-08-04 PROCEDURE — 99291 CRITICAL CARE FIRST HOUR: CPT | Performed by: INTERNAL MEDICINE

## 2021-08-04 PROCEDURE — 83615 LACTATE (LD) (LDH) ENZYME: CPT | Performed by: PHYSICIAN ASSISTANT

## 2021-08-04 PROCEDURE — 94799 UNLISTED PULMONARY SVC/PX: CPT

## 2021-08-04 PROCEDURE — 82550 ASSAY OF CK (CPK): CPT | Performed by: PHYSICIAN ASSISTANT

## 2021-08-04 PROCEDURE — 25010000002 CEFTRIAXONE PER 250 MG: Performed by: PHYSICIAN ASSISTANT

## 2021-08-04 PROCEDURE — 25010000002 DEXAMETHASONE PER 1 MG

## 2021-08-04 PROCEDURE — 97165 OT EVAL LOW COMPLEX 30 MIN: CPT

## 2021-08-04 PROCEDURE — 85384 FIBRINOGEN ACTIVITY: CPT | Performed by: PHYSICIAN ASSISTANT

## 2021-08-04 PROCEDURE — 36415 COLL VENOUS BLD VENIPUNCTURE: CPT | Performed by: PHYSICIAN ASSISTANT

## 2021-08-04 PROCEDURE — 85007 BL SMEAR W/DIFF WBC COUNT: CPT | Performed by: PHYSICIAN ASSISTANT

## 2021-08-04 PROCEDURE — 0W9930Z DRAINAGE OF RIGHT PLEURAL CAVITY WITH DRAINAGE DEVICE, PERCUTANEOUS APPROACH: ICD-10-PCS | Performed by: INTERNAL MEDICINE

## 2021-08-04 PROCEDURE — 86140 C-REACTIVE PROTEIN: CPT | Performed by: PHYSICIAN ASSISTANT

## 2021-08-04 PROCEDURE — 25010000002 MORPHINE PER 10 MG: Performed by: INTERNAL MEDICINE

## 2021-08-04 PROCEDURE — 82728 ASSAY OF FERRITIN: CPT | Performed by: PHYSICIAN ASSISTANT

## 2021-08-04 PROCEDURE — 25010000002 FUROSEMIDE PER 20 MG: Performed by: PHYSICIAN ASSISTANT

## 2021-08-04 PROCEDURE — 32556 INSERT CATH PLEURA W/O IMAGE: CPT | Performed by: INTERNAL MEDICINE

## 2021-08-04 PROCEDURE — 25010000002 ENOXAPARIN PER 10 MG: Performed by: INTERNAL MEDICINE

## 2021-08-04 PROCEDURE — 99233 SBSQ HOSP IP/OBS HIGH 50: CPT | Performed by: STUDENT IN AN ORGANIZED HEALTH CARE EDUCATION/TRAINING PROGRAM

## 2021-08-04 RX ORDER — MORPHINE SULFATE 2 MG/ML
INJECTION, SOLUTION INTRAMUSCULAR; INTRAVENOUS
Status: DISPENSED
Start: 2021-08-04 | End: 2021-08-04

## 2021-08-04 RX ORDER — MORPHINE SULFATE 2 MG/ML
2 INJECTION, SOLUTION INTRAMUSCULAR; INTRAVENOUS
Status: DISCONTINUED | OUTPATIENT
Start: 2021-08-04 | End: 2021-08-08 | Stop reason: HOSPADM

## 2021-08-04 RX ADMIN — DEXAMETHASONE SODIUM PHOSPHATE 10 MG: 10 INJECTION INTRAMUSCULAR; INTRAVENOUS at 08:01

## 2021-08-04 RX ADMIN — AZITHROMYCIN 500 MG: 500 INJECTION, POWDER, LYOPHILIZED, FOR SOLUTION INTRAVENOUS at 08:01

## 2021-08-04 RX ADMIN — BUDESONIDE 0.5 MG: 0.5 INHALANT ORAL at 19:03

## 2021-08-04 RX ADMIN — ARFORMOTEROL TARTRATE 15 MCG: 15 SOLUTION RESPIRATORY (INHALATION) at 07:12

## 2021-08-04 RX ADMIN — HYDROCODONE POLISTIREX AND CHLORPHENIRAMINE POLISTIREX 5 ML: 10; 8 SUSPENSION, EXTENDED RELEASE ORAL at 13:04

## 2021-08-04 RX ADMIN — LEVOTHYROXINE SODIUM 125 MCG: 125 TABLET ORAL at 07:31

## 2021-08-04 RX ADMIN — MORPHINE SULFATE 2 MG: 2 INJECTION, SOLUTION INTRAMUSCULAR; INTRAVENOUS at 09:14

## 2021-08-04 RX ADMIN — BUDESONIDE 0.5 MG: 0.5 INHALANT ORAL at 07:12

## 2021-08-04 RX ADMIN — IPRATROPIUM BROMIDE AND ALBUTEROL SULFATE 3 ML: .5; 2.5 SOLUTION RESPIRATORY (INHALATION) at 19:03

## 2021-08-04 RX ADMIN — FUROSEMIDE 20 MG: 10 INJECTION, SOLUTION INTRAMUSCULAR; INTRAVENOUS at 08:00

## 2021-08-04 RX ADMIN — HYDROCODONE POLISTIREX AND CHLORPHENIRAMINE POLISTIREX 5 ML: 10; 8 SUSPENSION, EXTENDED RELEASE ORAL at 05:25

## 2021-08-04 RX ADMIN — IPRATROPIUM BROMIDE AND ALBUTEROL SULFATE 3 ML: .5; 2.5 SOLUTION RESPIRATORY (INHALATION) at 07:12

## 2021-08-04 RX ADMIN — MORPHINE SULFATE 2 MG: 2 INJECTION, SOLUTION INTRAMUSCULAR; INTRAVENOUS at 09:30

## 2021-08-04 RX ADMIN — ARFORMOTEROL TARTRATE 15 MCG: 15 SOLUTION RESPIRATORY (INHALATION) at 19:03

## 2021-08-04 RX ADMIN — CEFTRIAXONE 1 G: 10 INJECTION, POWDER, FOR SOLUTION INTRAVENOUS at 08:01

## 2021-08-04 RX ADMIN — ENOXAPARIN SODIUM 120 MG: 120 INJECTION SUBCUTANEOUS at 22:40

## 2021-08-04 RX ADMIN — REMDESIVIR 100 MG: 100 INJECTION, POWDER, LYOPHILIZED, FOR SOLUTION INTRAVENOUS at 12:49

## 2021-08-04 RX ADMIN — PANTOPRAZOLE SODIUM 40 MG: 40 TABLET, DELAYED RELEASE ORAL at 05:25

## 2021-08-04 RX ADMIN — IPRATROPIUM BROMIDE AND ALBUTEROL SULFATE 3 ML: .5; 2.5 SOLUTION RESPIRATORY (INHALATION) at 12:39

## 2021-08-04 RX ADMIN — DEXAMETHASONE SODIUM PHOSPHATE 10 MG: 10 INJECTION INTRAMUSCULAR; INTRAVENOUS at 20:17

## 2021-08-04 RX ADMIN — ENOXAPARIN SODIUM 120 MG: 120 INJECTION SUBCUTANEOUS at 10:24

## 2021-08-04 NOTE — PROCEDURES
Chest Tube Placement Procedure Note    Indication: Right sided pneumothorax    Consent obtained: Yes    Time Out: performed at bedside.    Pre-Medication: Local lidocaine.    Procedure: The patient was placed in a supine position.  The right chest was prepped with chlorprep and draped.  Local anesthesia over the insertion site was applied with 1% lidocaine.  An incision was made in the 5th rib space along the midaxillary line. Blunt dissection up and over the rib was performed until access was obtained into the pleural cavity.  A 14 Swiss chest tube was placed and connected to pleurovac.  Initial output from the tube was air and minimal bloody drainage. The tube was sutured in place at the skin and the site was covered with an occlusive dressing.  A chest x-ray was obtained to evaluate placement which showed resolution of pneumothorax.    The patient tolerated the procedure well.     Complications: None

## 2021-08-04 NOTE — PROGRESS NOTES
[facility]     Progress Note    Patient Name: Antonio Persaud  : 1970  MRN: [id number]  Primary Care Physician:  Debora Leonardo APRN  Date of admission: 2021    Subjective   Subjective     50y/o male critically ill in CCU with COVID-19     Date of Admission: 2021  CCU day: 4  Oxygen requirement: Airvo 60 L 100%  Sedation: None  Pressors: None  Critical drips: None  Antibiotic regimen: Ceftriaxone azithromycin  Lines and insertion date: Peripherals     COVID-19 treatment:  Decadron with planning for 10-day course  Remdesivir to complete 5-day course  Actemra x2     This morning....  Patient is awake and alert.  He has been having some increased work of breathing and right-sided chest pain.  Chest x-ray this morning showed moderate sized pneumothorax  Complaining of dry cough   Shortness of breath with minimal exertion  D-dimer increased 15.65  Currently on Lovenox 40 mg daily  Has no fever or chills.  No nausea or vomiting.      ROS:  General: Fatigue, otherwise denied complaints  HEENT: Denied complaints  Respiratory: Dyspnea, cough, otherwise denied complaints  Cardiovascular: Denied complaints  GI: Denied complaints  MSK: Denied complaints      Objective   Objective     Vitals:   Temp:  [97.8 °F (36.6 °C)-98.6 °F (37 °C)] 98.6 °F (37 °C)  Heart Rate:  [] 111  Resp:  [20-28] 22  BP: (117-182)/() 144/100  Flow (L/min):  [60] 60    Physical Exam   Vital Signs Reviewed  General ill-appearing male, awake and alert  HEENT:  PERRL, EOMI.  OP, nares clear, on max air Vo  Neck:  Supple, no JVD, no thyromegaly  Chest: Decreased breath sounds right greater than left  CV: RRR, no MGR, pulses 2+, equal.  Abd:  Soft, NT, ND, + BS, no HSM  EXT:  no clubbing, no cyanosis, no edema  Neuro:  A&Ox3, CN grossly intact, no focal deficits  Skin: No rashes or lesions noted      Result Review    Result Review:  I have personally reviewed the results from the time of this admission to 2021  07:42 EDT and agree with these findings:  [x]  Laboratory  [x]  Microbiology  [x]  Radiology  [x]  EKG/Telemetry   [x]  Cardiology/Vascular   []  Pathology  [x]  Old records  []  Other:  Most notable findings include: Chest x-ray this morning showing pneumothorax on the right side.    Assessment/Plan   Assessment / Plan     Brief Patient Summary:  Antonio Persaud is a 51 y.o. male who critically ill in intensive care unit with ARDS secondary to COVID-19 complicated by pneumothorax    Active Hospital Problems:  Active Hospital Problems    Diagnosis    • Acute hypoxemic respiratory failure due to COVID-19 (CMS/HCC)    • Acute respiratory distress syndrome (ARDS) due to 2019 novel coronavirus (CMS/[facility])    • Pneumonia due to COVID-19 virus    • Hyponatremia    • Acute kidney injury (CMS/[facility])    • Transaminitis    • Pneumonia of both lungs due to infectious organism    • Hypothyroidism    • Acute respiratory failure with hypoxia (CMS/[facility])        Plan:   Continue supplemental O2 as needed keep O2 saturations greater than 88%  Continue breathing treatments and BPH protocol  Continue to encourage self proning  Continue to trend COVID-19 inflammatory [id number], CRP and ferritin every 48 hours  Continue Decadron with plan to complete 10-day course  Continue remdesivir with plan to complete 5-day course  Has received actemra x 2  CXR this AM showed pneumothorax, chest tube placed,  Continue LWS -20cm  Morphine 2 mg every 3 hours as needed for moderate pain secondary to chest tube placement  Continue diuresis w/ lasix 20 mg IV once daily  Johns catheter to maintain accurate I's and O's  Trend renal panel, LFTs and electrolytes  Will replace electrolytes as needed  As needed for cough management  Change Lovenox dose to weightbase secondary to increasing D-dimer     DVT prophylaxis:  Medical DVT prophylaxis orders are present.    CODE STATUS:    Code Status: CPR  Medical Interventions (Level of Support Prior to Arrest):  Full    The above plan is a product of multidisciplinary rounds performed this morning on 8/4/2021 at bedside with the critical care team consisting of the on-call physician, Dr. Cheng,  and myself Yanira Fierro PA-C, bedside RN, pharmacist, respiratory therapist, dietitian and other Allied health services.    Dictated utilizing Dragon Dictation.      Electronically signed by KWASI Herron 08/04/21 10:43 EDT      ====================================================    Mr. Persaud is 51 years old male with COVID-19 infection, viral pneumonia, ARDS, respiratory failure, pneumothorax.    Large right-sided pneumothorax seen this morning, chest tube was placed at bedside.  Repeat chest x-ray after tube placement shows resolution of pneumothorax  Continue with increased Decadron dose 10 mg twice daily. Remdesivir to complete 5 days course.  He has already received 2 doses of Tocilizumab. Continue with Lasix 20 mg once daily, keep negative balance.  Air vo and bipap intermittently.  Has tenuous respiratory status and overall poor prognosis.  Continues to need ICU care.  Self proning during daytime.  Monitor inflammatory markers and renal and liver functions. Minimally improved compared to yesterday.    Critical care time spent 35 minutes excluding any procedure.

## 2021-08-04 NOTE — PLAN OF CARE
Goal Outcome Evaluation:  Plan of Care Reviewed With: patient           Outcome Summary: Patient currently independent with all self-care and functional transfers.  Patient in agreement that he does not need any additional therapy at this time as he is able to get up on his own and perform his own self-care

## 2021-08-04 NOTE — PROGRESS NOTES
[facility]   Hospitalist Progress Note  Date: 2021  Patient Name: Antonio Persaud  : 1970  MRN: [id number]  Date of admission: 2021      Subjective   Subjective     Chief Complaint:   Shortness of breath    Summary:   Antonio Persaud 51-year-old gentleman with a past medical history significant for Graves' disease.  Patient presented to the hospital on 2021 with chief complaint of shortness of breath.  Patient had tested positive for Covid around 1 week ago, patient was not vaccinated.  Patient was quickly taken to the ICU given his oxygen needs.  Patient has been placed on air Vo maxed out with nonrebreather over place.  Patient has been weaned down to air Vo alone 60 L 100%.  Patient appears much more comfortable on rounds.    Interval Followup: Patient had chest x-ray showing right-sided pneumothorax.  Subsequently this morning he had right-sided chest tube placed without complication.  Currently his pain is well controlled.  He is laying on his side upon entering the room.    Review of Systems   All systems were reviewed and negative except for: Improved dyspnea, anxiety    Objective   Objective     Vitals:   Temp:  [97.8 °F (36.6 °C)-98.6 °F (37 °C)] 97.8 °F (36.6 °C)  Heart Rate:  [] 111  Resp:  [20-28] 22  BP: (117-182)/() 145/102  Flow (L/min):  [60] 60  Physical Exam    Constitutional: Awake, alert, increased work of breathing   Eyes: Pupils equal, sclerae anicteric, no conjunctival injection   HENT: NCAT, mucous membranes moist   Respiratory: Diminished breath sounds bilaterally, crackles bilaterally, on Airvo   Cardiovascular: RRR, no murmurs, rubs, or gallops, palpable pedal pulses bilaterally   Gastrointestinal: Positive bowel sounds, soft, nontender, nondistended   Musculoskeletal: No bilateral ankle edema, no clubbing or cyanosis to extremities   Psychiatric: Appropriate affect, cooperative   Neurologic: Mentation appropriate questioning, moves all extremities, no  slurred speech, no focal deficits   Skin: No rashes     Result Review    Result Review:  I have personally reviewed the results from the time of this admission to 8/4/2021 09:09 EDT and agree with these findings:  [x]  Laboratory  [x]  Microbiology  [x]  Radiology  [x]  EKG/Telemetry   [x]  Cardiology/Vascular   []  Pathology  []  Old records  []  Other:    Assessment/Plan   Assessment / Plan     Assessment/Plan:  Covid 19 pneumonia  Acute hypoxic respiratory failure    Right-sided pneumothorax status post thoracotomy tube placement  ARDS  Lactic acidosis, resolved  Therapeutic drug monitoring while on remdesivir  Acute renal failure    Multifocal pneumonia  Transaminitis    Plan:  Patient remains admitted to the ICU for further care management  Pulmonary following, appreciate systems with patient, status post chest tube placement  Chest tube per pulmonary  Continue with Remdesivir for a total of 5 days  Continue with Decadron for total of 10 days  Continue with empiric antibiotic therapy, if patient remains afebrile culture data remains negative can likely discontinue   Continue Brovana, Pulmicort, DuoNebs as needed,.  Encourage self proning, incentive spirometer  Continue with dexamethasone to 10 mg twice daily  Continue diuresis with Lasix monitor I's and O's, daily weights,   Status post Actemra x2  Continue with appropriate home medication regimen  PT/OT following  Case management for discharge planning    Discussed plan with RN, pulmonologist    DVT prophylaxis:  Medical DVT prophylaxis orders are present.    CODE STATUS:   Code Status: CPR  Medical Interventions (Level of Support Prior to Arrest): Full    Electronically signed by Alec Ta DO, 08/04/21, 9:10 AM EDT.

## 2021-08-04 NOTE — THERAPY EVALUATION
Patient Name: Antonio Persaud  : 1970    MRN: [id number]                              Today's Date: 2021       Admit Date: 2021    Visit Dx:     ICD-10-CM ICD-9-CM   1. Pneumonia of both lungs due to infectious organism, unspecified part of lung  J18.9 483.8   2. COVID-19  U07.1 079.89   3. Decreased activities of daily living (ADL)  Z78.9 V49.89     Patient Active Problem List   Diagnosis   • Pneumonia of both lungs due to infectious organism   • Hypothyroidism   • Acute respiratory failure with hypoxia (CMS/HCC)   • Acute hypoxemic respiratory failure due to COVID-19 (CMS/HCC)   • Acute respiratory distress syndrome (ARDS) due to 2019 novel coronavirus (CMS/HCC)   • Pneumonia due to COVID-19 virus   • Hyponatremia   • Acute kidney injury (CMS/HCC)   • Transaminitis     Past Medical History:   Diagnosis Date   • Graves disease      History reviewed. No pertinent surgical history.  General Information     Row Name 21 0929          OT Time and Intention    Document Type  evaluation  -PG     Mode of Treatment  individual therapy;occupational therapy  -PG     Row Name 21 0929          General Information    Patient Profile Reviewed  yes  -PG     Prior Level of Function  independent:;transfer;ADL's;work;gait  -PG     Existing Precautions/Restrictions  no known precautions/restrictions  -PG     Barriers to Rehab  none identified  -PG     Row Name 2129          Occupational Profile    Reason for Services/Referral (Occupational Profile)  Decreased ADL performance  -PG     Row Name 2129          Living Environment    Lives With  spouse  -PG     Row Name 2129          Cognition    Orientation Status (Cognition)  oriented x 4  -PG     Row Name 2129          Safety Issues, Functional Mobility    Safety Issues Affecting Function (Mobility)  ability to follow commands  -PG     Comment, Safety Issues/Impairments (Mobility)  No impairments affecting function at this  time  -PG       User Key  (r) = Recorded By, (t) = Taken By, (c) = Cosigned By    Initials Name Provider Type    Malcom Oneil OT Occupational Therapist          Mobility/ADL's     Row Name 08/04/21 0931          Transfers    Comment (Transfers)  Patient independent with all functional transfers  -PG     Row Name 08/04/21 0931          Activities of Daily Living    BADL Assessment/Intervention  -- Patient independent with all self-care including toileting  -PG       User Key  (r) = Recorded By, (t) = Taken By, (c) = Cosigned By    Initials Name Provider Type    Malcom Oneil OT Occupational Therapist        Obj/Interventions     [facility] Name 08/04/21 0932          Sensory Assessment (Somatosensory)    Sensory Assessment (Somatosensory)  sensation intact  -PG     Row Name 08/04/21 0932          Vision Assessment/Intervention    Visual Impairment/Limitations  corrective lenses for reading  -PG     Row Name 08/04/21 0932          Range of Motion Comprehensive    General Range of Motion  no range of motion deficits identified  -PG     Row Name 08/04/21 0932          Strength Comprehensive (MMT)    Comment, General Manual Muscle Testing (MMT) Assessment  5/5 bilateral upper extremity strength  -PG     Row Name 08/04/21 0932          Motor Skills    Motor Skills  coordination;functional endurance  -PG     Coordination  WNL  -PG     Functional Endurance  Fair minus  -PG       User Key  (r) = Recorded By, (t) = Taken By, (c) = Cosigned By    Initials Name Provider Type    Malcom Oneil OT Occupational Therapist        Goals/Plan    No documentation.       Clinical Impression     Row Name 08/04/21 0932          Pain Assessment    Additional Documentation  Pain Scale: Numbers Pre/Post-Treatment (Group)  -PG     Row Name 08/04/21 0932          Pain Scale: Numbers Pre/Post-Treatment    Pretreatment Pain Rating  0/10 - no pain  -PG     Posttreatment Pain Rating  0/10 - no pain  -PG     Row Name 08/04/21 0932           Plan of Care Review    Plan of Care Reviewed With  patient  -PG     Outcome Summary  Patient currently independent with all self-care and functional transfers.  Patient in agreement that he does not need any additional therapy at this time as he is able to get up on his own and perform his own self-care  -PG     Row Name 08/04/21 0932          Therapy Assessment/Plan (OT)    Patient/Family Therapy Goal Statement (OT)  Patient would like to return home independently  -PG     Criteria for Skilled Therapeutic Interventions Met (OT)  no;no problems identified which require skilled intervention  -PG     Therapy Frequency (OT)  evaluation only  -PG     Row Name 08/04/21 0932          Therapy Plan Review/Discharge Plan (OT)    Anticipated Discharge Disposition (OT)  home  -PG       User Key  (r) = Recorded By, (t) = Taken By, (c) = Cosigned By    Initials Name Provider Type    PG Malcom Renteria, OT Occupational Therapist        Outcome Measures     Row Name 08/04/21 0934          How much help from another is currently needed...    Putting on and taking off regular lower body clothing?  4  -PG     Bathing (including washing, rinsing, and drying)  4  -PG     Toileting (which includes using toilet bed pan or urinal)  4  -PG     Putting on and taking off regular upper body clothing  4  -PG     Taking care of personal grooming (such as brushing teeth)  4  -PG     Eating meals  4  -PG     AM-PAC 6 Clicks Score (OT)  24  -PG     Row Name 08/04/21 0934          Functional Assessment    Outcome Measure Options  AM-PAC 6 Clicks Daily Activity (OT);Optimal Instrument  -PG     Row Name 08/04/21 0934          Optimal Instrument    Optimal Instrument  Optimal - 3  -PG     Bending/Stooping  1  -PG     Standing  1  -PG     Reaching  1  -PG     From the list, choose the 3 activities you would most like to be able to do without any difficulty  Bending/stooping;Standing;Reaching  -PG     Total Score Optimal - 3  3  -PG       User Key  (r) =  Recorded By, (t) = Taken By, (c) = Cosigned By    Initials Name Provider Type    PG Malcom Renteria OT Occupational Therapist            OT Recommendation and Plan  Therapy Frequency (OT): evaluation only  Plan of Care Review  Plan of Care Reviewed With: patient  Outcome Summary: Patient currently independent with all self-care and functional transfers.  Patient in agreement that he does not need any additional therapy at this time as he is able to get up on his own and perform his own self-care     Time Calculation:   Time Calculation- OT     Row Name 08/04/21 0935             Time Calculation- OT    OT Received On  08/04/21  -PG         Untimed Charges    OT Eval/Re-eval Minutes  30  -PG         Total Minutes    Untimed Charges Total Minutes  30  -PG       Total Minutes  30  -PG        User Key  (r) = Recorded By, (t) = Taken By, (c) = Cosigned By    Initials Name Provider Type    PG Malcom Renteria OT Occupational Therapist        Therapy Charges for Today     Code Description Service Date Service Provider Modifiers Qty    [id number] HC OT EVAL LOW COMPLEXITY 2 8/4/2021 Malcom Renteria OT GO 1               Malcom Renteria OT  8/4/2021

## 2021-08-04 NOTE — PLAN OF CARE
Goal Outcome Evaluation:              Outcome Summary: Patient was observed in room completing transferes independently. He is currently limited by shortness of breath and AirVo. Patient will be discharged from skilled PT services as he does not demonstrate any physical or functional limitations. Patient may benefit from pulmonary rehab once discharged from the hospital.

## 2021-08-05 ENCOUNTER — APPOINTMENT (OUTPATIENT)
Dept: GENERAL RADIOLOGY | Facility: HOSPITAL | Age: 51
End: 2021-08-05

## 2021-08-05 LAB
ALBUMIN SERPL-MCNC: 3.2 G/DL (ref 3.5–5.2)
ALBUMIN/GLOB SERPL: 1.4 G/DL
ALP SERPL-CCNC: 107 U/L (ref 39–117)
ALT SERPL W P-5'-P-CCNC: 95 U/L (ref 1–41)
ANION GAP SERPL CALCULATED.3IONS-SCNC: 9.2 MMOL/L (ref 5–15)
AST SERPL-CCNC: 52 U/L (ref 1–40)
BASOPHILS # BLD AUTO: 0.1 10*3/MM3 (ref 0–0.2)
BASOPHILS NFR BLD AUTO: 0.7 % (ref 0–1.5)
BILIRUB CONJ SERPL-MCNC: 0.2 MG/DL (ref 0–0.3)
BILIRUB SERPL-MCNC: 0.6 MG/DL (ref 0–1.2)
BUN SERPL-MCNC: 24 MG/DL (ref 6–20)
BUN/CREAT SERPL: 24 (ref 7–25)
CALCIUM SPEC-SCNC: 8.8 MG/DL (ref 8.6–10.5)
CHLORIDE SERPL-SCNC: 104 MMOL/L (ref 98–107)
CO2 SERPL-SCNC: 24.8 MMOL/L (ref 22–29)
CREAT SERPL-MCNC: 1 MG/DL (ref 0.76–1.27)
DEPRECATED RDW RBC AUTO: 41.4 FL (ref 37–54)
EOSINOPHIL # BLD AUTO: 0.01 10*3/MM3 (ref 0–0.4)
EOSINOPHIL NFR BLD AUTO: 0.1 % (ref 0.3–6.2)
ERYTHROCYTE [DISTWIDTH] IN BLOOD BY AUTOMATED COUNT: 13.2 % (ref 12.3–15.4)
GFR SERPL CREATININE-BSD FRML MDRD: 78 ML/MIN/1.73
GLOBULIN UR ELPH-MCNC: 2.3 GM/DL
GLUCOSE SERPL-MCNC: 126 MG/DL (ref 65–99)
HCT VFR BLD AUTO: 49 % (ref 37.5–51)
HGB BLD-MCNC: 16.9 G/DL (ref 13–17.7)
IMM GRANULOCYTES # BLD AUTO: 1.01 10*3/MM3 (ref 0–0.05)
IMM GRANULOCYTES NFR BLD AUTO: 7.5 % (ref 0–0.5)
LYMPHOCYTES # BLD AUTO: 0.67 10*3/MM3 (ref 0.7–3.1)
LYMPHOCYTES NFR BLD AUTO: 5 % (ref 19.6–45.3)
MAGNESIUM SERPL-MCNC: 2.2 MG/DL (ref 1.6–2.6)
MCH RBC QN AUTO: 29.9 PG (ref 26.6–33)
MCHC RBC AUTO-ENTMCNC: 34.5 G/DL (ref 31.5–35.7)
MCV RBC AUTO: 86.6 FL (ref 79–97)
MONOCYTES # BLD AUTO: 0.72 10*3/MM3 (ref 0.1–0.9)
MONOCYTES NFR BLD AUTO: 5.4 % (ref 5–12)
NEUTROPHILS NFR BLD AUTO: 10.91 10*3/MM3 (ref 1.7–7)
NEUTROPHILS NFR BLD AUTO: 81.3 % (ref 42.7–76)
NRBC BLD AUTO-RTO: 0 /100 WBC (ref 0–0.2)
PHOSPHATE SERPL-MCNC: 4.1 MG/DL (ref 2.5–4.5)
PLAT MORPH BLD: NORMAL
PLATELET # BLD AUTO: 222 10*3/MM3 (ref 140–450)
PMV BLD AUTO: 10.9 FL (ref 6–12)
POTASSIUM SERPL-SCNC: 4.3 MMOL/L (ref 3.5–5.2)
PROT SERPL-MCNC: 5.5 G/DL (ref 6–8.5)
RBC # BLD AUTO: 5.66 10*6/MM3 (ref 4.14–5.8)
RBC MORPH BLD: NORMAL
SODIUM SERPL-SCNC: 138 MMOL/L (ref 136–145)
WBC # BLD AUTO: 13.42 10*3/MM3 (ref 3.4–10.8)
WBC MORPH BLD: NORMAL

## 2021-08-05 PROCEDURE — 84100 ASSAY OF PHOSPHORUS: CPT | Performed by: PHYSICIAN ASSISTANT

## 2021-08-05 PROCEDURE — 94799 UNLISTED PULMONARY SVC/PX: CPT

## 2021-08-05 PROCEDURE — 71045 X-RAY EXAM CHEST 1 VIEW: CPT | Performed by: RADIOLOGY

## 2021-08-05 PROCEDURE — 85025 COMPLETE CBC W/AUTO DIFF WBC: CPT | Performed by: PHYSICIAN ASSISTANT

## 2021-08-05 PROCEDURE — 25010000002 DEXAMETHASONE PER 1 MG

## 2021-08-05 PROCEDURE — 99291 CRITICAL CARE FIRST HOUR: CPT | Performed by: INTERNAL MEDICINE

## 2021-08-05 PROCEDURE — 25010000002 ENOXAPARIN PER 10 MG: Performed by: INTERNAL MEDICINE

## 2021-08-05 PROCEDURE — 85007 BL SMEAR W/DIFF WBC COUNT: CPT | Performed by: PHYSICIAN ASSISTANT

## 2021-08-05 PROCEDURE — 99233 SBSQ HOSP IP/OBS HIGH 50: CPT | Performed by: STUDENT IN AN ORGANIZED HEALTH CARE EDUCATION/TRAINING PROGRAM

## 2021-08-05 PROCEDURE — 83735 ASSAY OF MAGNESIUM: CPT | Performed by: PHYSICIAN ASSISTANT

## 2021-08-05 PROCEDURE — 82248 BILIRUBIN DIRECT: CPT | Performed by: GENERAL PRACTICE

## 2021-08-05 PROCEDURE — 80053 COMPREHEN METABOLIC PANEL: CPT | Performed by: STUDENT IN AN ORGANIZED HEALTH CARE EDUCATION/TRAINING PROGRAM

## 2021-08-05 PROCEDURE — 71045 X-RAY EXAM CHEST 1 VIEW: CPT

## 2021-08-05 PROCEDURE — 25010000002 FUROSEMIDE PER 20 MG: Performed by: PHYSICIAN ASSISTANT

## 2021-08-05 PROCEDURE — 25010000002 CEFTRIAXONE PER 250 MG: Performed by: PHYSICIAN ASSISTANT

## 2021-08-05 RX ORDER — HYDRALAZINE HYDROCHLORIDE 20 MG/ML
20 INJECTION INTRAMUSCULAR; INTRAVENOUS EVERY 6 HOURS PRN
Status: DISCONTINUED | OUTPATIENT
Start: 2021-08-05 | End: 2021-08-08 | Stop reason: HOSPADM

## 2021-08-05 RX ADMIN — DEXAMETHASONE SODIUM PHOSPHATE 10 MG: 10 INJECTION INTRAMUSCULAR; INTRAVENOUS at 08:35

## 2021-08-05 RX ADMIN — IPRATROPIUM BROMIDE AND ALBUTEROL SULFATE 3 ML: .5; 2.5 SOLUTION RESPIRATORY (INHALATION) at 13:08

## 2021-08-05 RX ADMIN — ARFORMOTEROL TARTRATE 15 MCG: 15 SOLUTION RESPIRATORY (INHALATION) at 06:54

## 2021-08-05 RX ADMIN — PANTOPRAZOLE SODIUM 40 MG: 40 TABLET, DELAYED RELEASE ORAL at 06:17

## 2021-08-05 RX ADMIN — DEXAMETHASONE SODIUM PHOSPHATE 10 MG: 10 INJECTION INTRAMUSCULAR; INTRAVENOUS at 20:00

## 2021-08-05 RX ADMIN — ARFORMOTEROL TARTRATE 15 MCG: 15 SOLUTION RESPIRATORY (INHALATION) at 19:14

## 2021-08-05 RX ADMIN — IPRATROPIUM BROMIDE AND ALBUTEROL SULFATE 3 ML: .5; 2.5 SOLUTION RESPIRATORY (INHALATION) at 19:14

## 2021-08-05 RX ADMIN — BUDESONIDE 0.5 MG: 0.5 INHALANT ORAL at 19:14

## 2021-08-05 RX ADMIN — CEFTRIAXONE 1 G: 10 INJECTION, POWDER, FOR SOLUTION INTRAVENOUS at 08:35

## 2021-08-05 RX ADMIN — IPRATROPIUM BROMIDE AND ALBUTEROL SULFATE 3 ML: .5; 2.5 SOLUTION RESPIRATORY (INHALATION) at 06:54

## 2021-08-05 RX ADMIN — FUROSEMIDE 20 MG: 10 INJECTION, SOLUTION INTRAMUSCULAR; INTRAVENOUS at 08:35

## 2021-08-05 RX ADMIN — BUDESONIDE 0.5 MG: 0.5 INHALANT ORAL at 06:54

## 2021-08-05 RX ADMIN — LEVOTHYROXINE SODIUM 125 MCG: 125 TABLET ORAL at 06:17

## 2021-08-05 RX ADMIN — ENOXAPARIN SODIUM 120 MG: 120 INJECTION SUBCUTANEOUS at 08:36

## 2021-08-05 RX ADMIN — ENOXAPARIN SODIUM 120 MG: 120 INJECTION SUBCUTANEOUS at 21:24

## 2021-08-05 NOTE — PROGRESS NOTES
[facility]     Progress Note    Patient Name: Antonio Persaud  : 1970  MRN: [id number]  Primary Care Physician:  Debora Leonardo APRN  Date of admission: 2021    Subjective   Subjective     50y/o male critically ill in CCU with COVID-19     Date of Admission: 2021  CCU day: 5  Oxygen requirement: Airvo 60 L 100%  Sedation: None  Pressors: None  Critical drips: None  Antibiotic regimen: Previously on ceftriaxone azithromycin  Lines and insertion date: Peripherals     COVID-19 treatment:  Decadron with planning for 10-day course  Remdesivir to complete 5-day course  Actemra x2     This morning....  Pt is awake and alert  Maxed out on Airvo  Still with chest tube to suction  Repeating CXR this AM    ROS:  General: Fatigue, otherwise denied complaints  HEENT: Denied complaints  Respiratory: Dyspnea, cough (improved with Tussinex), otherwise denied complaints  Cardiovascular: Denied complaints  GI: Denied complaints  MSK: Denied complaints      Objective   Objective     Vitals:   Temp:  [97.6 °F (36.4 °C)-98.1 °F (36.7 °C)] 97.6 °F (36.4 °C)  Heart Rate:  [] 56  Resp:  [16-25] 25  BP: (123-186)/() 155/80  Flow (L/min):  [45-60] 45    Physical Exam   Vital Signs Reviewed  General ill-appearing male, awake and alert on max Airvo  HEENT:  PERRL, EOMI.  OP, nares clear  Neck:  Supple, no JVD, no thyromegaly  Chest: Diminished breath sounds bilaterally, equal rise and fall of chest  CV: RRR, no MGR, pulses 2+, equal.  Abd:  Soft, NT, ND, + BS, no HSM  EXT:  no clubbing, no cyanosis, no edema  Neuro:  A&Ox3, CN grossly intact, no focal deficits  Skin: No rashes or lesions noted      Result Review    Result Review:  I have personally reviewed the results from the time of this admission to 2021 07:21 EDT and agree with these findings:  [x]  Laboratory  [x]  Microbiology  [x]  Radiology  [x]  EKG/Telemetry   [x]  Cardiology/Vascular   []  Pathology  [x]  Old records  []  Other:  Most  notable findings include: Chest x-ray 8/4 showing pneumothorax on the right side now with chest tube placed, D-dimer 15.65    Assessment/Plan   Assessment / Plan     Brief Patient Summary:  Antonio Persaud is a 51 y.o. male who critically ill in intensive care unit with ARDS secondary to COVID-19 complicated by pneumothorax    Active Hospital Problems:  Active Hospital Problems    Diagnosis    • Acute hypoxemic respiratory failure due to COVID-19 (CMS/[facility])    • Acute respiratory distress syndrome (ARDS) due to 2019 novel coronavirus (CMS/[facility])    • Pneumonia due to COVID-19 virus    • Hyponatremia    • Acute kidney injury (CMS/[facility])    • Transaminitis    • Pneumonia of both lungs due to infectious organism    • Hypothyroidism    • Acute respiratory failure with hypoxia (CMS/[facility])        Plan:   Continue supplemental O2 as needed keep O2 saturations greater than 88%  Continue breathing treatments and BPH protocol  Continue to encourage self proning  Continue to trend COVID-19 inflammatory [id number], CRP and ferritin every 48 hours  Continue Decadron with plan to complete 10-day course  Continue remdesivir with plan to complete 5-day course  Has received actemra x 2  Repeat CXR this morning to evaluate chest tube placement/pneumothorax  Continue diuresis w/ lasix 20 mg IV once daily  Monitor accurate I&Os  Trend renal panel, LFTs and electrolytes  Will replace electrolytes as needed  Tussinex as needed for cough management  Continue Lovenox dose to weightbase secondary to increasing D-dimer     DVT prophylaxis:  Medical DVT prophylaxis orders are present.    CODE STATUS:    Code Status: CPR  Medical Interventions (Level of Support Prior to Arrest): Full    The above plan is a product of multidisciplinary rounds performed this morning on 8/5/2021 at bedside with the critical care team consisting of the on-call physician, Dr. Cheng,  and myself Yanira Fierro PA-C, bedside RN, pharmacist, respiratory therapist,  dietitian and other Allied health services.    Dictated utilizing Dragon Dictation.    Electronically signed by KWASI Herron 08/05/21 10:17 EDT        ====================================================    Mr. Persaud is 51 years old male with COVID-19 infection, viral pneumonia, ARDS, respiratory failure, pneumothorax.      Continue with increased Decadron dose 10 mg twice daily. Remdesivir to complete 5 days course.  He has already received 2 doses of Tocilizumab. Continue with Lasix 20 mg once daily, keep negative balance.  Air vo and bipap intermittently.  Has tenuous respiratory status and overall poor prognosis.  Continues to need ICU care.  Pneumothorax is resolved, currently on chest tube to suction.  Continue chest tube to suction for now.  Oxygenation improving, currently on FiO2 at 70%.  Self proning during daytime.  Monitor inflammatory markers and renal and liver functions.   Continue with as needed morphine for chest pain.    Critical care time spent 32 minutes excluding any procedure.

## 2021-08-05 NOTE — PROGRESS NOTES
[facility]   Hospitalist Progress Note  Date: 2021  Patient Name: Antonio Persaud  : 1970  MRN: [id number]  Date of admission: 2021      Subjective   Subjective     Chief Complaint:   Shortness of breath    Summary:   Antonio Persaud 51-year-old gentleman with a past medical history significant for Graves' disease.  Patient presented to the hospital on 2021 with chief complaint of shortness of breath.  Patient had tested positive for Covid around 1 week ago, patient was not vaccinated.  Patient was quickly taken to the ICU given his oxygen needs.  Patient has been placed on air Vo maxed out with nonrebreather over place.  Patient has been weaned down to air Vo alone 60 L 100%.  Patient appears much more comfortable on rounds.    Interval Followup: No events overnight.  Patient currently stable.  He says that he is wanting to get up and move around more.  Patient was told that he is maxed out on oxygen and he would likely desaturate more with movement.  He was encouraged to rest today.  He says his chest tube site is not bothering him.  He denies any current chest pain, chest pressure, palpitations, fever, chills.  He says he is still tolerating oral intake without nausea or emesis.  He states he wants to get better to get out of the ICU.    Review of Systems   All systems were reviewed and negative except for: Improved dyspnea, anxiety    Objective   Objective     Vitals:   Temp:  [97.6 °F (36.4 °C)-98.1 °F (36.7 °C)] 97.6 °F (36.4 °C)  Heart Rate:  [] 56  Resp:  [16-25] 25  BP: (123-186)/() 155/80  Flow (L/min):  [45-60] 45  Physical Exam    Constitutional: Awake, alert, increased work of breathing   Eyes: Pupils equal, sclerae anicteric, no conjunctival injection   HENT: NCAT, mucous membranes moist   Respiratory: Diminished breath sounds bilaterally, crackles bilaterally, on Airvo, tachypneic   Cardiovascular: RRR, no murmurs, rubs, or gallops, palpable pedal pulses  bilaterally   Gastrointestinal: Positive bowel sounds, soft, nontender, nondistended   Musculoskeletal: No bilateral ankle edema, no clubbing or cyanosis to extremities   Psychiatric: Appropriate affect, cooperative   Neurologic: Mentation appropriate questioning, moves all extremities, no slurred speech, no focal deficits   Skin: No rashes     Result Review    Result Review:  I have personally reviewed the results from the time of this admission to 8/5/2021 08:03 EDT and agree with these findings:  [x]  Laboratory  [x]  Microbiology  [x]  Radiology  [x]  EKG/Telemetry   [x]  Cardiology/Vascular   []  Pathology  []  Old records  []  Other:    Assessment/Plan   Assessment / Plan     Assessment/Plan:  Covid 19 pneumonia  Acute hypoxic respiratory failure    Right-sided pneumothorax status post thoracotomy tube placement  ARDS  Lactic acidosis, resolved  Therapeutic drug monitoring while on remdesivir  Acute renal failure    Multifocal pneumonia  Transaminitis    Plan:  Patient remains admitted to the ICU for further care management  Pulmonary following, appreciate systems with patient, status post chest tube placement  Repeat chest x-ray reviewed, no pneumothorax seen on right side, chest tube per pulmonary   Continue with Remdesivir for a total of 5 days  Continue with Decadron BID for total of 10 days  Continue with empiric antibiotic therapy, if patient remains afebrile culture data remains negative can likely discontinue   Continue Brovana, Pulmicort, DuoNebs as needed,.  Encourage self proning, incentive spirometer  Continue diuresis with Lasix IV 20 mg daily, monitor I's and O's, daily weights,   Status post Actemra x2  Continue with appropriate home medication regimen  PT/OT following  Case management for discharge planning    Prognosis remains poor.    Discussed plan with RN, pulmonologist     DVT prophylaxis:  Medical DVT prophylaxis orders are present.    CODE STATUS:   Code Status: CPR  Medical  Interventions (Level of Support Prior to Arrest): Full    Electronically signed by Alec Ta DO, 08/05/21, 8:03 AM EDT.

## 2021-08-06 ENCOUNTER — APPOINTMENT (OUTPATIENT)
Dept: GENERAL RADIOLOGY | Facility: HOSPITAL | Age: 51
End: 2021-08-06

## 2021-08-06 LAB
ALBUMIN SERPL-MCNC: 3.2 G/DL (ref 3.5–5.2)
ALBUMIN/GLOB SERPL: 1.3 G/DL
ALP SERPL-CCNC: 100 U/L (ref 39–117)
ALT SERPL W P-5'-P-CCNC: 86 U/L (ref 1–41)
ANION GAP SERPL CALCULATED.3IONS-SCNC: 9.3 MMOL/L (ref 5–15)
AST SERPL-CCNC: 41 U/L (ref 1–40)
BILIRUB CONJ SERPL-MCNC: 0.2 MG/DL (ref 0–0.3)
BILIRUB SERPL-MCNC: 0.7 MG/DL (ref 0–1.2)
BUN SERPL-MCNC: 28 MG/DL (ref 6–20)
BUN/CREAT SERPL: 30.8 (ref 7–25)
CALCIUM SPEC-SCNC: 8.9 MG/DL (ref 8.6–10.5)
CHLORIDE SERPL-SCNC: 106 MMOL/L (ref 98–107)
CO2 SERPL-SCNC: 26.7 MMOL/L (ref 22–29)
CREAT SERPL-MCNC: 0.91 MG/DL (ref 0.76–1.27)
D DIMER PPP FEU-MCNC: 12.12 MG/L (FEU) (ref 0–0.59)
DEPRECATED RDW RBC AUTO: 40.9 FL (ref 37–54)
ERYTHROCYTE [DISTWIDTH] IN BLOOD BY AUTOMATED COUNT: 13.1 % (ref 12.3–15.4)
FIBRINOGEN PPP-MCNC: 139 MG/DL (ref 200–450)
GFR SERPL CREATININE-BSD FRML MDRD: 87 ML/MIN/1.73
GLOBULIN UR ELPH-MCNC: 2.4 GM/DL
GLUCOSE SERPL-MCNC: 134 MG/DL (ref 65–99)
HCT VFR BLD AUTO: 49.4 % (ref 37.5–51)
HGB BLD-MCNC: 17.1 G/DL (ref 13–17.7)
LDH SERPL-CCNC: 653 U/L (ref 135–225)
MAGNESIUM SERPL-MCNC: 2.3 MG/DL (ref 1.6–2.6)
MCH RBC QN AUTO: 29.8 PG (ref 26.6–33)
MCHC RBC AUTO-ENTMCNC: 34.6 G/DL (ref 31.5–35.7)
MCV RBC AUTO: 86.2 FL (ref 79–97)
NRBC BLD AUTO-RTO: 0.1 /100 WBC (ref 0–0.2)
PHOSPHATE SERPL-MCNC: 4.9 MG/DL (ref 2.5–4.5)
PLATELET # BLD AUTO: 215 10*3/MM3 (ref 140–450)
PMV BLD AUTO: 11.3 FL (ref 6–12)
POTASSIUM SERPL-SCNC: 4.4 MMOL/L (ref 3.5–5.2)
PROT SERPL-MCNC: 5.6 G/DL (ref 6–8.5)
RBC # BLD AUTO: 5.73 10*6/MM3 (ref 4.14–5.8)
RBC MORPH BLD: NORMAL
SMALL PLATELETS BLD QL SMEAR: ADEQUATE
SODIUM SERPL-SCNC: 142 MMOL/L (ref 136–145)
WBC # BLD AUTO: 15.49 10*3/MM3 (ref 3.4–10.8)
WBC MORPH BLD: NORMAL

## 2021-08-06 PROCEDURE — 25010000002 ENOXAPARIN PER 10 MG: Performed by: INTERNAL MEDICINE

## 2021-08-06 PROCEDURE — 36415 COLL VENOUS BLD VENIPUNCTURE: CPT | Performed by: GENERAL PRACTICE

## 2021-08-06 PROCEDURE — 85384 FIBRINOGEN ACTIVITY: CPT | Performed by: PHYSICIAN ASSISTANT

## 2021-08-06 PROCEDURE — 84100 ASSAY OF PHOSPHORUS: CPT | Performed by: INTERNAL MEDICINE

## 2021-08-06 PROCEDURE — 94799 UNLISTED PULMONARY SVC/PX: CPT

## 2021-08-06 PROCEDURE — 25010000002 FUROSEMIDE PER 20 MG: Performed by: PHYSICIAN ASSISTANT

## 2021-08-06 PROCEDURE — 25010000002 DEXAMETHASONE PER 1 MG

## 2021-08-06 PROCEDURE — 99291 CRITICAL CARE FIRST HOUR: CPT | Performed by: INTERNAL MEDICINE

## 2021-08-06 PROCEDURE — 94760 N-INVAS EAR/PLS OXIMETRY 1: CPT

## 2021-08-06 PROCEDURE — 80053 COMPREHEN METABOLIC PANEL: CPT | Performed by: INTERNAL MEDICINE

## 2021-08-06 PROCEDURE — 83735 ASSAY OF MAGNESIUM: CPT | Performed by: INTERNAL MEDICINE

## 2021-08-06 PROCEDURE — 85025 COMPLETE CBC W/AUTO DIFF WBC: CPT | Performed by: INTERNAL MEDICINE

## 2021-08-06 PROCEDURE — 83615 LACTATE (LD) (LDH) ENZYME: CPT | Performed by: PHYSICIAN ASSISTANT

## 2021-08-06 PROCEDURE — 99233 SBSQ HOSP IP/OBS HIGH 50: CPT | Performed by: STUDENT IN AN ORGANIZED HEALTH CARE EDUCATION/TRAINING PROGRAM

## 2021-08-06 PROCEDURE — 71045 X-RAY EXAM CHEST 1 VIEW: CPT | Performed by: RADIOLOGY

## 2021-08-06 PROCEDURE — 71045 X-RAY EXAM CHEST 1 VIEW: CPT

## 2021-08-06 PROCEDURE — 85379 FIBRIN DEGRADATION QUANT: CPT | Performed by: PHYSICIAN ASSISTANT

## 2021-08-06 PROCEDURE — 85007 BL SMEAR W/DIFF WBC COUNT: CPT | Performed by: INTERNAL MEDICINE

## 2021-08-06 PROCEDURE — 25010000002 CEFTRIAXONE PER 250 MG: Performed by: PHYSICIAN ASSISTANT

## 2021-08-06 PROCEDURE — 82248 BILIRUBIN DIRECT: CPT | Performed by: GENERAL PRACTICE

## 2021-08-06 RX ADMIN — PANTOPRAZOLE SODIUM 40 MG: 40 TABLET, DELAYED RELEASE ORAL at 06:11

## 2021-08-06 RX ADMIN — LEVOTHYROXINE SODIUM 125 MCG: 125 TABLET ORAL at 06:11

## 2021-08-06 RX ADMIN — IPRATROPIUM BROMIDE AND ALBUTEROL SULFATE 3 ML: .5; 2.5 SOLUTION RESPIRATORY (INHALATION) at 06:34

## 2021-08-06 RX ADMIN — IPRATROPIUM BROMIDE AND ALBUTEROL SULFATE 3 ML: .5; 2.5 SOLUTION RESPIRATORY (INHALATION) at 13:35

## 2021-08-06 RX ADMIN — DEXAMETHASONE SODIUM PHOSPHATE 10 MG: 10 INJECTION INTRAMUSCULAR; INTRAVENOUS at 08:46

## 2021-08-06 RX ADMIN — ENOXAPARIN SODIUM 120 MG: 120 INJECTION SUBCUTANEOUS at 21:12

## 2021-08-06 RX ADMIN — ENOXAPARIN SODIUM 120 MG: 120 INJECTION SUBCUTANEOUS at 09:18

## 2021-08-06 RX ADMIN — CEFTRIAXONE 1 G: 10 INJECTION, POWDER, FOR SOLUTION INTRAVENOUS at 08:46

## 2021-08-06 RX ADMIN — IPRATROPIUM BROMIDE AND ALBUTEROL SULFATE 3 ML: .5; 2.5 SOLUTION RESPIRATORY (INHALATION) at 19:33

## 2021-08-06 RX ADMIN — FUROSEMIDE 20 MG: 10 INJECTION, SOLUTION INTRAMUSCULAR; INTRAVENOUS at 08:45

## 2021-08-06 RX ADMIN — SODIUM CHLORIDE, PRESERVATIVE FREE 10 ML: 5 INJECTION INTRAVENOUS at 08:46

## 2021-08-06 RX ADMIN — ARFORMOTEROL TARTRATE 15 MCG: 15 SOLUTION RESPIRATORY (INHALATION) at 06:34

## 2021-08-06 RX ADMIN — ARFORMOTEROL TARTRATE 15 MCG: 15 SOLUTION RESPIRATORY (INHALATION) at 19:33

## 2021-08-06 RX ADMIN — BUDESONIDE 0.5 MG: 0.5 INHALANT ORAL at 19:33

## 2021-08-06 RX ADMIN — BUDESONIDE 0.5 MG: 0.5 INHALANT ORAL at 06:34

## 2021-08-06 RX ADMIN — DEXAMETHASONE SODIUM PHOSPHATE 10 MG: 10 INJECTION INTRAMUSCULAR; INTRAVENOUS at 21:12

## 2021-08-06 NOTE — THERAPY EVALUATION
Patient continuing to wean on AIRVO support. Evaluation for liberation ongoing. Patient is currently stable on 30L/50%.

## 2021-08-06 NOTE — CONSULTS
Nutrition Services    Patient Name:  Antonio Persaud  YOB: 1970  MRN: [id number]  Admit Date:  7/30/2021    Patient reviewed related to LOS day 7. Weaning from airvo to high flow per chart review. Continues to eat 25-50% meals. Receiving Boost Very High Calorie TID w/ meals, providing an additional 1590 kcal and 66 g protein daily. RD will continue to monitor and follow per protocol.    Electronically signed by:  Nyla Huynh RD  08/06/21 13:24 EDT

## 2021-08-06 NOTE — PROGRESS NOTES
[facility]     Progress Note    Patient Name: Antonio Persaud  : 1970  MRN: [id number]  Primary Care Physician:  Debora Leonardo APRN  Date of admission: 2021    Subjective   Subjective     50y/o male critically ill in CCU with COVID-19     Date of Admission: 2021  CCU day: 6  Oxygen requirement: Airvo   Antibiotic regimen: Previously on ceftriaxone azithromycin  Lines and insertion date: Peripherals, chest tube in place     COVID-19 treatment:  Decadron with planning for 10-day course  Remdesivir to complete 5-day course  Actemra x2.    Over last 24 hours, patient continued on chest tube to suction.  Weaned oxygen.  Continue to Decadron.  Continue with Lovenox, increase to therapeutic dose with high D-dimer.     This morning....  Pt is awake and alert  Currently on oxygen at 45% FiO2 and 35 L/min.  Still with chest tube to suction.  Has no fever or chills.  No significant chest pain.  Has some cough, producing minimal phlegm.  Remains bedridden, willing to try to get out of bed.    ROS:  General: Fatigue, otherwise denied complaints  HEENT: Denied complaints  Respiratory: Dyspnea, cough (improved with Tussinex), otherwise denied complaints  Cardiovascular: Denied complaints  GI: Denied complaints  MSK: Denied complaints      Objective   Objective     Vitals:   Temp:  [96 °F (35.6 °C)-97.4 °F (36.3 °C)] 97.4 °F (36.3 °C)  Heart Rate:  [] 102  Resp:  [17-28] 20  BP: (131-170)/() 169/94  Flow (L/min):  [30-45] 30    Physical Exam   Vital Signs Reviewed  General ill-appearing male, awake and alert on Airvo, mild increased work of breathing  HEENT:  PERRL, EOMI.  OP, nares clear  Neck:  Supple, no JVD, no thyromegaly  Chest: Diminished breath sounds bilaterally, equal rise and fall of chest  Chest wall: Right-sided chest tube in place to suction, minimal tenderness around chest tube site  CV: RRR, no MGR, pulses 2+, equal.  Abd:  Soft, NT, ND, + BS, no HSM  EXT:  no clubbing, no  cyanosis, no edema  Neuro:  A&Ox3, CN grossly intact, no focal deficits  Skin: No rashes or lesions noted      Result Review    Result Review:  I have personally reviewed the results from the time of this admission to 8/6/2021 07:13 EDT and agree with these findings:  [x]  Laboratory  [x]  Microbiology  [x]  Radiology  [x]  EKG/Telemetry   [x]  Cardiology/Vascular   []  Pathology  [x]  Old records  []  Other:  Most notable findings include: Chest x-ray yesterday with no pneumothorax.    Assessment/Plan   Assessment / Plan     Brief Patient Summary:  Antonio Persaud is a 51 y.o. male who critically ill in intensive care unit with ARDS secondary to COVID-19 complicated by pneumothorax    Active Hospital Problems:  Active Hospital Problems    Diagnosis    • Acute hypoxemic respiratory failure due to COVID-19 (CMS/H[facility])    • Acute respiratory distress syndrome (ARDS) due to 2019 novel coronavirus (CMS/[facility])    • Pneumonia due to COVID-19 virus    • Hyponatremia    • Acute kidney injury (CMS/H[facility])    • Transaminitis    • Pneumonia of both lungs due to infectious organism    • Hypothyroidism    • Acute respiratory failure with hypoxia (CMS/HCC)        Plan:   Continue supplemental O2 as needed keep O2 saturations greater than 88%.  Wean air Vo, can likely go to high flow nasal oxygen today.  Continue breathing treatments and BPH protocol.  Continue to encourage self proning  Continue to trend COVID-19 inflammatory [id number], CRP and ferritin every 48 hours  Continue Decadron 9 mg twice daily with plan to complete 10-day course  Completed remdesivir course.  Has received actemra x 2.  Repeat CXR this morning to evaluate chest tube placement/pneumothorax  Continue diuresis w/ lasix 20 mg IV once daily  Monitor accurate I&Os  Trend renal panel, LFTs and electrolytes  Will replace electrolytes as needed  Tussinex as needed for cough management  Continue Lovenox dose to weightbase therapeutic dose secondary to increasing  D-dimer     DVT prophylaxis:  Medical DVT prophylaxis orders are present.    CODE STATUS:    Code Status: CPR  Medical Interventions (Level of Support Prior to Arrest): Full      Mr. Persaud is 51 years old male with COVID-19 infection, viral pneumonia, ARDS, respiratory failure, pneumothorax.      Critical care time spent 32 minutes excluding any procedure.

## 2021-08-06 NOTE — PLAN OF CARE
Goal Outcome Evaluation:  Plan of Care Reviewed With: patient        Progress: improving  Outcome Summary: Patient has been up in the chair since lunch.  Tolerating it well.  Oxygen saturation as been remaining in the mid 90's % since being up in the chair.

## 2021-08-06 NOTE — PROGRESS NOTES
[facility]   Hospitalist Progress Note  Date: 2021  Patient Name: Antonio Persaud  : 1970  MRN: [id number]  Date of admission: 2021      Subjective   Subjective     Chief Complaint:   Shortness of breath    Summary:   Antonio Persaud 51-year-old gentleman with a past medical history significant for Graves' disease.  Patient presented to the hospital on 2021 with chief complaint of shortness of breath.  Patient had tested positive for Covid around 1 week ago, patient was not vaccinated.  Patient was quickly taken to the ICU given his oxygen needs.  Patient has been placed on air Vo maxed out with nonrebreather over place.  Patient has been weaned down to air Vo alone 60 L 100%.  Patient appears much more comfortable on rounds.    Interval Followup: No events overnight.  Patient states that he is feeling better.  His oxygen requirements have decreased.  He still has right-sided chest tube in place.  He says his pain is essentially well controlled to absent.  He is urinating without issue.  He is tolerating oral intake without any nausea or emesis.  He states he is wanting to get better to get out of ICU.  He says that he is wanting to get up to bedside chair today.    Review of Systems   All systems were reviewed and negative except for: Improved dyspnea, anxiety    Objective   Objective     Vitals:   Temp:  [96 °F (35.6 °C)-98.1 °F (36.7 °C)] 98.1 °F (36.7 °C)  Heart Rate:  [] 102  Resp:  [17-28] 20  BP: (131-170)/() 169/94  Flow (L/min):  [30-45] 30  Physical Exam    Constitutional: Awake, alert, increased work of breathing   Eyes: Pupils equal, sclerae anicteric, no conjunctival injection   HENT: NCAT, mucous membranes moist   Respiratory: Diminished breath sounds bilaterally, crackles bilaterally, on Airvo, right-sided chest tube in place   Cardiovascular: RRR, no murmurs, rubs, or gallops, palpable pedal pulses bilaterally   Gastrointestinal: Positive bowel sounds, soft,  nontender, nondistended    Musculoskeletal: No bilateral ankle edema, no clubbing or cyanosis to extremities   Psychiatric: Appropriate affect, cooperative   Neurologic: Mentation appropriate questioning, moves all extremities, no slurred speech, no focal deficits   Skin: No rashes     Result Review    Result Review:  I have personally reviewed the results from the time of this admission to 8/6/2021 08:46 EDT and agree with these findings:  [x]  Laboratory  [x]  Microbiology  [x]  Radiology  [x]  EKG/Telemetry   [x]  Cardiology/Vascular   []  Pathology  []  Old records  []  Other:    Assessment/Plan   Assessment / Plan     Assessment/Plan:  Covid 19 pneumonia  Acute hypoxic respiratory failure    Right-sided pneumothorax status post thoracotomy tube placement  ARDS  Lactic acidosis, resolved  Therapeutic drug monitoring while on remdesivir  Acute renal failure    Multifocal pneumonia  Transaminitis    Plan:  Continue to monitor in ICU  Patient remains admitted to the ICU for further care management  Pulmonary following, appreciate systems with patient, status post chest tube placement  Chest x-ray 8/6 reviewed, no pneumothorax  Continue with Remdesivir for a total of 5 days  Continue with Decadron BID for total of 10 days  Completed 7-day course of Rocephin  Continue Brovana, Pulmicort, DuoNebs as needed,.  Encourage self proning, incentive spirometer  Continue diuresis with Lasix IV 20 mg daily, monitor I's and O's, daily weights,   Status post Actemra x2  Continue with appropriate home medication regimen  Continue with weight-based Lovenox dosing due to elevated D-dimer  PT/OT following  Case management for discharge planning    Prognosis remains poor.    Discussed plan with RN, pulmonologist     DVT prophylaxis:  Medical DVT prophylaxis orders are present.    CODE STATUS:   Code Status: CPR  Medical Interventions (Level of Support Prior to Arrest): Full    Electronically signed by Alec Ta DO, 08/06/21,  8:46 AM EDT.

## 2021-08-07 ENCOUNTER — APPOINTMENT (OUTPATIENT)
Dept: GENERAL RADIOLOGY | Facility: HOSPITAL | Age: 51
End: 2021-08-07

## 2021-08-07 LAB
ALBUMIN SERPL-MCNC: 3.4 G/DL (ref 3.5–5.2)
ALBUMIN/GLOB SERPL: 1.4 G/DL
ALP SERPL-CCNC: 93 U/L (ref 39–117)
ALT SERPL W P-5'-P-CCNC: 98 U/L (ref 1–41)
ANION GAP SERPL CALCULATED.3IONS-SCNC: 9.1 MMOL/L (ref 5–15)
AST SERPL-CCNC: 38 U/L (ref 1–40)
BASOPHILS # BLD AUTO: 0.11 10*3/MM3 (ref 0–0.2)
BASOPHILS NFR BLD AUTO: 0.7 % (ref 0–1.5)
BILIRUB CONJ SERPL-MCNC: 0.2 MG/DL (ref 0–0.3)
BILIRUB SERPL-MCNC: 0.6 MG/DL (ref 0–1.2)
BUN SERPL-MCNC: 34 MG/DL (ref 6–20)
BUN/CREAT SERPL: 30.1 (ref 7–25)
CALCIUM SPEC-SCNC: 8.8 MG/DL (ref 8.6–10.5)
CHLORIDE SERPL-SCNC: 102 MMOL/L (ref 98–107)
CO2 SERPL-SCNC: 28.9 MMOL/L (ref 22–29)
CREAT SERPL-MCNC: 1.13 MG/DL (ref 0.76–1.27)
DEPRECATED RDW RBC AUTO: 43.3 FL (ref 37–54)
EOSINOPHIL # BLD AUTO: 0.01 10*3/MM3 (ref 0–0.4)
EOSINOPHIL NFR BLD AUTO: 0.1 % (ref 0.3–6.2)
ERYTHROCYTE [DISTWIDTH] IN BLOOD BY AUTOMATED COUNT: 13.7 % (ref 12.3–15.4)
GFR SERPL CREATININE-BSD FRML MDRD: 68 ML/MIN/1.73
GLOBULIN UR ELPH-MCNC: 2.4 GM/DL
GLUCOSE SERPL-MCNC: 135 MG/DL (ref 65–99)
HCT VFR BLD AUTO: 50.4 % (ref 37.5–51)
HGB BLD-MCNC: 16.9 G/DL (ref 13–17.7)
IMM GRANULOCYTES # BLD AUTO: 1.38 10*3/MM3 (ref 0–0.05)
IMM GRANULOCYTES NFR BLD AUTO: 8.6 % (ref 0–0.5)
LYMPHOCYTES # BLD AUTO: 1.07 10*3/MM3 (ref 0.7–3.1)
LYMPHOCYTES NFR BLD AUTO: 6.7 % (ref 19.6–45.3)
MAGNESIUM SERPL-MCNC: 2.3 MG/DL (ref 1.6–2.6)
MCH RBC QN AUTO: 29.4 PG (ref 26.6–33)
MCHC RBC AUTO-ENTMCNC: 33.5 G/DL (ref 31.5–35.7)
MCV RBC AUTO: 87.8 FL (ref 79–97)
MONOCYTES # BLD AUTO: 1.1 10*3/MM3 (ref 0.1–0.9)
MONOCYTES NFR BLD AUTO: 6.9 % (ref 5–12)
NEUTROPHILS NFR BLD AUTO: 12.38 10*3/MM3 (ref 1.7–7)
NEUTROPHILS NFR BLD AUTO: 77 % (ref 42.7–76)
NRBC BLD AUTO-RTO: 0 /100 WBC (ref 0–0.2)
PHOSPHATE SERPL-MCNC: 5.6 MG/DL (ref 2.5–4.5)
PLAT MORPH BLD: NORMAL
PLATELET # BLD AUTO: 224 10*3/MM3 (ref 140–450)
PMV BLD AUTO: 11 FL (ref 6–12)
POTASSIUM SERPL-SCNC: 4.7 MMOL/L (ref 3.5–5.2)
PROT SERPL-MCNC: 5.8 G/DL (ref 6–8.5)
RBC # BLD AUTO: 5.74 10*6/MM3 (ref 4.14–5.8)
RBC MORPH BLD: NORMAL
SODIUM SERPL-SCNC: 140 MMOL/L (ref 136–145)
WBC # BLD AUTO: 16.05 10*3/MM3 (ref 3.4–10.8)
WBC MORPH BLD: NORMAL

## 2021-08-07 PROCEDURE — 99233 SBSQ HOSP IP/OBS HIGH 50: CPT | Performed by: STUDENT IN AN ORGANIZED HEALTH CARE EDUCATION/TRAINING PROGRAM

## 2021-08-07 PROCEDURE — 80053 COMPREHEN METABOLIC PANEL: CPT | Performed by: INTERNAL MEDICINE

## 2021-08-07 PROCEDURE — 99291 CRITICAL CARE FIRST HOUR: CPT | Performed by: INTERNAL MEDICINE

## 2021-08-07 PROCEDURE — 94799 UNLISTED PULMONARY SVC/PX: CPT

## 2021-08-07 PROCEDURE — 85025 COMPLETE CBC W/AUTO DIFF WBC: CPT | Performed by: INTERNAL MEDICINE

## 2021-08-07 PROCEDURE — 25010000002 DEXAMETHASONE PER 1 MG

## 2021-08-07 PROCEDURE — 25010000002 ENOXAPARIN PER 10 MG: Performed by: INTERNAL MEDICINE

## 2021-08-07 PROCEDURE — 83735 ASSAY OF MAGNESIUM: CPT | Performed by: INTERNAL MEDICINE

## 2021-08-07 PROCEDURE — 85007 BL SMEAR W/DIFF WBC COUNT: CPT | Performed by: INTERNAL MEDICINE

## 2021-08-07 PROCEDURE — 82248 BILIRUBIN DIRECT: CPT | Performed by: GENERAL PRACTICE

## 2021-08-07 PROCEDURE — 84100 ASSAY OF PHOSPHORUS: CPT | Performed by: INTERNAL MEDICINE

## 2021-08-07 PROCEDURE — 25010000002 FUROSEMIDE PER 20 MG: Performed by: PHYSICIAN ASSISTANT

## 2021-08-07 PROCEDURE — 71045 X-RAY EXAM CHEST 1 VIEW: CPT

## 2021-08-07 RX ADMIN — PANTOPRAZOLE SODIUM 40 MG: 40 TABLET, DELAYED RELEASE ORAL at 06:06

## 2021-08-07 RX ADMIN — DEXAMETHASONE SODIUM PHOSPHATE 10 MG: 10 INJECTION INTRAMUSCULAR; INTRAVENOUS at 20:07

## 2021-08-07 RX ADMIN — LEVOTHYROXINE SODIUM 125 MCG: 125 TABLET ORAL at 06:06

## 2021-08-07 RX ADMIN — BUDESONIDE 0.5 MG: 0.5 INHALANT ORAL at 06:53

## 2021-08-07 RX ADMIN — ARFORMOTEROL TARTRATE 15 MCG: 15 SOLUTION RESPIRATORY (INHALATION) at 19:37

## 2021-08-07 RX ADMIN — SODIUM CHLORIDE, PRESERVATIVE FREE 10 ML: 5 INJECTION INTRAVENOUS at 09:24

## 2021-08-07 RX ADMIN — BUDESONIDE 0.5 MG: 0.5 INHALANT ORAL at 19:37

## 2021-08-07 RX ADMIN — ENOXAPARIN SODIUM 120 MG: 120 INJECTION SUBCUTANEOUS at 22:40

## 2021-08-07 RX ADMIN — ARFORMOTEROL TARTRATE 15 MCG: 15 SOLUTION RESPIRATORY (INHALATION) at 06:53

## 2021-08-07 RX ADMIN — ENOXAPARIN SODIUM 120 MG: 120 INJECTION SUBCUTANEOUS at 09:23

## 2021-08-07 RX ADMIN — IPRATROPIUM BROMIDE AND ALBUTEROL SULFATE 3 ML: .5; 2.5 SOLUTION RESPIRATORY (INHALATION) at 13:09

## 2021-08-07 RX ADMIN — IPRATROPIUM BROMIDE AND ALBUTEROL SULFATE 3 ML: .5; 2.5 SOLUTION RESPIRATORY (INHALATION) at 19:37

## 2021-08-07 RX ADMIN — DEXAMETHASONE SODIUM PHOSPHATE 10 MG: 10 INJECTION INTRAMUSCULAR; INTRAVENOUS at 09:24

## 2021-08-07 RX ADMIN — IPRATROPIUM BROMIDE AND ALBUTEROL SULFATE 3 ML: .5; 2.5 SOLUTION RESPIRATORY (INHALATION) at 06:53

## 2021-08-07 RX ADMIN — FUROSEMIDE 20 MG: 10 INJECTION, SOLUTION INTRAMUSCULAR; INTRAVENOUS at 09:23

## 2021-08-07 NOTE — PROGRESS NOTES
[facility]   Hospitalist Progress Note  Date: 2021  Patient Name: Antonio Persaud  : 1970  MRN: [id number]  Date of admission: 2021      Subjective   Subjective     Chief Complaint:   Shortness of breath    Summary:   Antonio Persaud 51-year-old gentleman with a past medical history significant for Graves' disease.  Patient presented to the hospital on 2021 with chief complaint of shortness of breath.  Patient had tested positive for Covid around 1 week ago, patient was not vaccinated.  Patient was quickly taken to the ICU given his oxygen needs.  Patient has been placed on air Vo maxed out with nonrebreather over place.  Patient has been weaned down to air Vo alone 60 L 100%.  Patient appears much more comfortable on rounds.    Interval Followup: No events overnight.  Patient continues to be weaned down his oxygen requirements.  He is sitting up in bedside chair in no acute distress.  He says he is still tolerating oral intake without any issues.  He notes that he denies any chest pain, chest pressure, palpitations.  He is stable for discharge to the medical floor.      Review of Systems   All systems were reviewed and negative except for: Improved dyspnea, anxiety    Objective   Objective     Vitals:   Temp:  [97.5 °F (36.4 °C)-98.3 °F (36.8 °C)] 97.7 °F (36.5 °C)  Heart Rate:  [] 89  Resp:  [17-26] 24  BP: (114-161)/() 133/91  Flow (L/min):  [7-30] 7  Physical Exam    Constitutional: Awake, alert, increased work of breathing   Eyes: Pupils equal, sclerae anicteric, no conjunctival injection   HENT: NCAT, mucous membranes moist   Respiratory: Diminished breath sounds bilaterally, crackles bilaterally, on Airvo, right-sided chest tube in place   Cardiovascular: RRR, no murmurs, rubs, or gallops, palpable pedal pulses bilaterally   Gastrointestinal: Positive bowel sounds, soft, nontender, nondistended    Musculoskeletal: No bilateral ankle edema, no clubbing or cyanosis to  extremities   Psychiatric: Appropriate affect, cooperative   Neurologic: Mentation appropriate questioning, moves all extremities, no slurred speech, no focal deficits   Skin: No rashes     Result Review    Result Review:  I have personally reviewed the results from the time of this admission to 8/7/2021 08:46 EDT and agree with these findings:  [x]  Laboratory  [x]  Microbiology  [x]  Radiology  [x]  EKG/Telemetry   [x]  Cardiology/Vascular   []  Pathology  []  Old records  []  Other:    Assessment/Plan   Assessment / Plan     Assessment/Plan:  Covid 19 pneumonia  Acute hypoxic respiratory failure    Right-sided pneumothorax status post thoracotomy tube placement  ARDS  Lactic acidosis, resolved  Therapeutic drug monitoring while on remdesivir  Acute renal failure    Multifocal pneumonia  Transaminitis    Plan:  Continue to monitor in ICU  Patient stable for floor transfer, ajit with pulmonary  Pulmonary following, appreciate systems with patient, status post chest tube placement, can likely remove tomorrow chest x-ray and patient remained stable   Continue with Remdesivir for a total of 5 days  Continue with Decadron BID for total of 10 days  Completed 7-day course of Rocephin  Continue Brovana, Pulmicort, DuoNebs as needed,.    Encourage self proning, incentive spirometer  Continue diuresis with Lasix IV 20 mg daily, monitor I's and O's, daily weights,   Status post Actemra x2  Continue with appropriate home medication regimen  Continue with weight-based Lovenox dosing due to elevated D-dimer  PT/OT following  Case management for discharge planning    Prognosis remains guarded.    Transfer patient out of ICU.    Discussed plan with RN, pulmonologist.    DVT prophylaxis:  Medical DVT prophylaxis orders are present.    CODE STATUS:   Code Status: CPR  Medical Interventions (Level of Support Prior to Arrest): Full    Electronically signed by Alec Ta DO, 08/07/21, 8:46 AM EDT.

## 2021-08-07 NOTE — PROGRESS NOTES
[facility]     Progress Note    Patient Name: Antonio Persaud  : 1970  MRN: [id number]  Primary Care Physician:  Debora Leonardo APRN  Date of admission: 2021    Subjective   Subjective     50y/o male critically ill in CCU with COVID-19     Date of Admission: 2021  CCU day: 6  Oxygen requirement: High flow nasal oxygen  Antibiotic regimen: Previously on ceftriaxone azithromycin  Lines and insertion date: Peripherals, chest tube in place     COVID-19 treatment:  Decadron with planning for 10-day course  Remdesivir to complete 5-day course  Actemra x2.    Over last 24 hours, patient continued on chest tube, switched to waterseal.  Weaned oxygen.  Continue on Decadron.  Continued with Lovenox, increase to therapeutic dose with high D-dimer.     This morning....  Pt is awake and alert.  Out of bed to chair.  Feels better.  Currently on high flow nasal oxygen at 10 L/min.  S tube to waterseal.  Chest x-ray without pneumothorax.  Has no fever or chills.  No significant chest pain.  Has some cough, producing minimal phlegm.      ROS:  General: Fatigue, otherwise denied complaints  HEENT: Denied complaints  Respiratory: Dyspnea, cough (improved with Tussinex), otherwise denied complaints  Cardiovascular: Denied complaints  GI: Denied complaints  MSK: Denied complaints      Objective   Objective     Vitals:   Temp:  [97.5 °F (36.4 °C)-98.3 °F (36.8 °C)] 97.7 °F (36.5 °C)  Heart Rate:  [] 89  Resp:  [17-26] 24  BP: (114-161)/() 133/91  Flow (L/min):  [7-30] 7    Physical Exam   Vital Signs Reviewed  General ill-appearing male, awake and alert on Airvo, mild increased work of breathing  HEENT:  PERRL, EOMI.  OP, nares clear  Neck:  Supple, no JVD, no thyromegaly  Chest: Diminished breath sounds bilaterally, equal rise and fall of chest  Chest wall: Right-sided chest tube in place to suction, minimal tenderness around chest tube site  CV: RRR, no MGR, pulses 2+, equal.  Abd:  Soft, NT,  ND, + BS, no HSM  EXT:  no clubbing, no cyanosis, no edema  Neuro:  A&Ox3, CN grossly intact, no focal deficits  Skin: No rashes or lesions noted      Result Review    Result Review:  I have personally reviewed the results from the time of this admission to 8/7/2021 08:06 EDT and agree with these findings:  [x]  Laboratory  [x]  Microbiology  [x]  Radiology  [x]  EKG/Telemetry   [x]  Cardiology/Vascular   []  Pathology  [x]  Old records  []  Other:  Most notable findings include: Chest x-ray this morning with no pneumothorax.  Serum creatinine 1.13.  D-dimer 12.12.    Assessment/Plan   Assessment / Plan     Brief Patient Summary:  Antonio Persaud is a 51 y.o. male who critically ill in intensive care unit with ARDS secondary to COVID-19 complicated by pneumothorax    Active Hospital Problems:  Active Hospital Problems    Diagnosis    • Acute hypoxemic respiratory failure due to COVID-19 (CMS/HCC)    • Acute respiratory distress syndrome (ARDS) due to 2019 novel coronavirus (CMS/HCC)    • Pneumonia due to COVID-19 virus    • Hyponatremia    • Acute kidney injury (CMS/HCC)    • Transaminitis    • Pneumonia of both lungs due to infectious organism    • Hypothyroidism    • Acute respiratory failure with hypoxia (CMS/HCC)        Plan:   Continue supplemental O2 as needed keep O2 saturations greater than 88%.  Currently on high flow nasal oxygen, wean oxygen as tolerated.    Continue breathing treatments and BPH protocol.  Continue to encourage self proning  Continue to trend COVID-19 inflammatory lxixwen-V-cobau, CRP and ferritin every 48 hours.  Continue Decadron 10 mg twice daily with plan to complete 10-day course  Completed remdesivir course.  Has received actemra x 2.  Chest x-ray with no pneumothorax.  We will clamp chest tube today.  Plan to remove chest tube tomorrow.  Hold diuretics today.  Increasing serum creatinine.  Monitor accurate I&Os  Trend renal panel, LFTs and electrolytes  Will replace electrolytes  as needed  Tussinex as needed for cough management  Continue Lovenox dose to weightbase therapeutic dose secondary to increasing D-dimer     DVT prophylaxis:  Medical DVT prophylaxis orders are present.    CODE STATUS:    Code Status: CPR  Medical Interventions (Level of Support Prior to Arrest): Full      Mr. Persaud is 51 years old male with COVID-19 infection, viral pneumonia, ARDS, respiratory failure, pneumothorax.      Critical care time spent 31 minutes excluding any procedure.

## 2021-08-07 NOTE — PLAN OF CARE
Goal Outcome Evaluation:  Plan of Care Reviewed With: patient        Progress: improving  Outcome Summary: Patient has been up in the chair since breakfast.  Tolerating well.  Currently waiting bed placement out of the unit.

## 2021-08-08 ENCOUNTER — APPOINTMENT (OUTPATIENT)
Dept: GENERAL RADIOLOGY | Facility: HOSPITAL | Age: 51
End: 2021-08-08

## 2021-08-08 VITALS
WEIGHT: 244.93 LBS | HEART RATE: 100 BPM | RESPIRATION RATE: 18 BRPM | HEIGHT: 70 IN | TEMPERATURE: 97.7 F | DIASTOLIC BLOOD PRESSURE: 99 MMHG | SYSTOLIC BLOOD PRESSURE: 125 MMHG | OXYGEN SATURATION: 95 % | BODY MASS INDEX: 35.07 KG/M2

## 2021-08-08 PROBLEM — D89.831 CYTOKINE RELEASE SYNDROME, GRADE 1: Status: ACTIVE | Noted: 2021-08-08

## 2021-08-08 LAB
ALBUMIN SERPL-MCNC: 3.7 G/DL (ref 3.5–5.2)
ALBUMIN/GLOB SERPL: 1.7 G/DL
ALP SERPL-CCNC: 87 U/L (ref 39–117)
ALT SERPL W P-5'-P-CCNC: 106 U/L (ref 1–41)
ANION GAP SERPL CALCULATED.3IONS-SCNC: 10.7 MMOL/L (ref 5–15)
AST SERPL-CCNC: 39 U/L (ref 1–40)
BASOPHILS # BLD AUTO: 0.02 10*3/MM3 (ref 0–0.2)
BASOPHILS NFR BLD AUTO: 0.1 % (ref 0–1.5)
BILIRUB CONJ SERPL-MCNC: 0.2 MG/DL (ref 0–0.3)
BILIRUB SERPL-MCNC: 0.8 MG/DL (ref 0–1.2)
BUN SERPL-MCNC: 34 MG/DL (ref 6–20)
BUN/CREAT SERPL: 36.6 (ref 7–25)
CALCIUM SPEC-SCNC: 9 MG/DL (ref 8.6–10.5)
CHLORIDE SERPL-SCNC: 100 MMOL/L (ref 98–107)
CO2 SERPL-SCNC: 26.3 MMOL/L (ref 22–29)
CREAT SERPL-MCNC: 0.93 MG/DL (ref 0.76–1.27)
D-DIMER HIGH CURVE: 5.61 MG/L (FEU) (ref 0–0.59)
DEPRECATED RDW RBC AUTO: 42.5 FL (ref 37–54)
EOSINOPHIL # BLD AUTO: 0 10*3/MM3 (ref 0–0.4)
EOSINOPHIL NFR BLD AUTO: 0 % (ref 0.3–6.2)
ERYTHROCYTE [DISTWIDTH] IN BLOOD BY AUTOMATED COUNT: 13.2 % (ref 12.3–15.4)
FIBRINOGEN PPP-MCNC: 173 MG/DL (ref 200–450)
GFR SERPL CREATININE-BSD FRML MDRD: 85 ML/MIN/1.73
GLOBULIN UR ELPH-MCNC: 2.2 GM/DL
GLUCOSE SERPL-MCNC: 129 MG/DL (ref 65–99)
HCT VFR BLD AUTO: 52.1 % (ref 37.5–51)
HGB BLD-MCNC: 17.8 G/DL (ref 13–17.7)
IMM GRANULOCYTES # BLD AUTO: 1.32 10*3/MM3 (ref 0–0.05)
IMM GRANULOCYTES NFR BLD AUTO: 8.1 % (ref 0–0.5)
LDH SERPL-CCNC: 523 U/L (ref 135–225)
LYMPHOCYTES # BLD AUTO: 1.24 10*3/MM3 (ref 0.7–3.1)
LYMPHOCYTES NFR BLD AUTO: 7.6 % (ref 19.6–45.3)
MAGNESIUM SERPL-MCNC: 2.3 MG/DL (ref 1.6–2.6)
MCH RBC QN AUTO: 30 PG (ref 26.6–33)
MCHC RBC AUTO-ENTMCNC: 34.2 G/DL (ref 31.5–35.7)
MCV RBC AUTO: 87.9 FL (ref 79–97)
MONOCYTES # BLD AUTO: 1.29 10*3/MM3 (ref 0.1–0.9)
MONOCYTES NFR BLD AUTO: 7.9 % (ref 5–12)
NEUTROPHILS NFR BLD AUTO: 12.51 10*3/MM3 (ref 1.7–7)
NEUTROPHILS NFR BLD AUTO: 76.3 % (ref 42.7–76)
NRBC BLD AUTO-RTO: 0 /100 WBC (ref 0–0.2)
PHOSPHATE SERPL-MCNC: 4.9 MG/DL (ref 2.5–4.5)
PLAT MORPH BLD: NORMAL
PLATELET # BLD AUTO: 197 10*3/MM3 (ref 140–450)
PMV BLD AUTO: 11.4 FL (ref 6–12)
POTASSIUM SERPL-SCNC: 4.6 MMOL/L (ref 3.5–5.2)
PROT SERPL-MCNC: 5.9 G/DL (ref 6–8.5)
RBC # BLD AUTO: 5.93 10*6/MM3 (ref 4.14–5.8)
RBC MORPH BLD: NORMAL
SODIUM SERPL-SCNC: 137 MMOL/L (ref 136–145)
WBC # BLD AUTO: 16.38 10*3/MM3 (ref 3.4–10.8)
WBC MORPH BLD: NORMAL

## 2021-08-08 PROCEDURE — 85007 BL SMEAR W/DIFF WBC COUNT: CPT | Performed by: INTERNAL MEDICINE

## 2021-08-08 PROCEDURE — 84100 ASSAY OF PHOSPHORUS: CPT | Performed by: INTERNAL MEDICINE

## 2021-08-08 PROCEDURE — 83735 ASSAY OF MAGNESIUM: CPT | Performed by: INTERNAL MEDICINE

## 2021-08-08 PROCEDURE — 25010000002 FUROSEMIDE PER 20 MG: Performed by: PHYSICIAN ASSISTANT

## 2021-08-08 PROCEDURE — 94618 PULMONARY STRESS TESTING: CPT

## 2021-08-08 PROCEDURE — 25010000002 DEXAMETHASONE PER 1 MG

## 2021-08-08 PROCEDURE — 99239 HOSP IP/OBS DSCHRG MGMT >30: CPT | Performed by: STUDENT IN AN ORGANIZED HEALTH CARE EDUCATION/TRAINING PROGRAM

## 2021-08-08 PROCEDURE — 85384 FIBRINOGEN ACTIVITY: CPT | Performed by: PHYSICIAN ASSISTANT

## 2021-08-08 PROCEDURE — 99233 SBSQ HOSP IP/OBS HIGH 50: CPT | Performed by: INTERNAL MEDICINE

## 2021-08-08 PROCEDURE — 83615 LACTATE (LD) (LDH) ENZYME: CPT | Performed by: PHYSICIAN ASSISTANT

## 2021-08-08 PROCEDURE — 25010000002 ENOXAPARIN PER 10 MG: Performed by: INTERNAL MEDICINE

## 2021-08-08 PROCEDURE — 85025 COMPLETE CBC W/AUTO DIFF WBC: CPT | Performed by: INTERNAL MEDICINE

## 2021-08-08 PROCEDURE — 85379 FIBRIN DEGRADATION QUANT: CPT | Performed by: PHYSICIAN ASSISTANT

## 2021-08-08 PROCEDURE — 82248 BILIRUBIN DIRECT: CPT | Performed by: GENERAL PRACTICE

## 2021-08-08 PROCEDURE — 71045 X-RAY EXAM CHEST 1 VIEW: CPT

## 2021-08-08 PROCEDURE — 94799 UNLISTED PULMONARY SVC/PX: CPT

## 2021-08-08 PROCEDURE — 80053 COMPREHEN METABOLIC PANEL: CPT | Performed by: STUDENT IN AN ORGANIZED HEALTH CARE EDUCATION/TRAINING PROGRAM

## 2021-08-08 PROCEDURE — 36415 COLL VENOUS BLD VENIPUNCTURE: CPT | Performed by: PHYSICIAN ASSISTANT

## 2021-08-08 RX ORDER — ARFORMOTEROL TARTRATE 15 UG/2ML
15 SOLUTION RESPIRATORY (INHALATION)
Qty: 120 ML | Refills: 0 | Status: SHIPPED | OUTPATIENT
Start: 2021-08-08 | End: 2021-09-07

## 2021-08-08 RX ORDER — IPRATROPIUM BROMIDE AND ALBUTEROL SULFATE 2.5; .5 MG/3ML; MG/3ML
3 SOLUTION RESPIRATORY (INHALATION)
Qty: 360 ML | Refills: 0 | Status: SHIPPED | OUTPATIENT
Start: 2021-08-08 | End: 2021-09-07

## 2021-08-08 RX ORDER — BUDESONIDE 0.5 MG/2ML
0.5 INHALANT ORAL
Qty: 120 ML | Refills: 0 | Status: SHIPPED | OUTPATIENT
Start: 2021-08-08 | End: 2021-09-07

## 2021-08-08 RX ORDER — BENZONATATE 100 MG/1
100 CAPSULE ORAL 3 TIMES DAILY PRN
Qty: 30 CAPSULE | Refills: 0 | Status: SHIPPED | OUTPATIENT
Start: 2021-08-08 | End: 2021-09-07

## 2021-08-08 RX ORDER — DEXAMETHASONE 4 MG/1
6 TABLET ORAL DAILY
Qty: 6 TABLET | Refills: 0 | Status: SHIPPED | OUTPATIENT
Start: 2021-08-08 | End: 2021-08-12

## 2021-08-08 RX ORDER — FUROSEMIDE 20 MG/1
20 TABLET ORAL
Qty: 15 TABLET | Refills: 0 | Status: SHIPPED | OUTPATIENT
Start: 2021-08-08 | End: 2021-09-07

## 2021-08-08 RX ADMIN — PANTOPRAZOLE SODIUM 40 MG: 40 TABLET, DELAYED RELEASE ORAL at 06:43

## 2021-08-08 RX ADMIN — IPRATROPIUM BROMIDE AND ALBUTEROL SULFATE 3 ML: .5; 2.5 SOLUTION RESPIRATORY (INHALATION) at 07:06

## 2021-08-08 RX ADMIN — BUDESONIDE 0.5 MG: 0.5 INHALANT ORAL at 07:06

## 2021-08-08 RX ADMIN — ARFORMOTEROL TARTRATE 15 MCG: 15 SOLUTION RESPIRATORY (INHALATION) at 07:06

## 2021-08-08 RX ADMIN — IPRATROPIUM BROMIDE AND ALBUTEROL SULFATE 3 ML: .5; 2.5 SOLUTION RESPIRATORY (INHALATION) at 12:24

## 2021-08-08 RX ADMIN — HYDROCODONE POLISTIREX AND CHLORPHENIRAMINE POLISTIREX 5 ML: 10; 8 SUSPENSION, EXTENDED RELEASE ORAL at 02:11

## 2021-08-08 RX ADMIN — DEXAMETHASONE SODIUM PHOSPHATE 10 MG: 10 INJECTION INTRAMUSCULAR; INTRAVENOUS at 09:14

## 2021-08-08 RX ADMIN — FUROSEMIDE 20 MG: 10 INJECTION, SOLUTION INTRAMUSCULAR; INTRAVENOUS at 09:14

## 2021-08-08 RX ADMIN — LEVOTHYROXINE SODIUM 125 MCG: 125 TABLET ORAL at 06:43

## 2021-08-08 RX ADMIN — ENOXAPARIN SODIUM 120 MG: 120 INJECTION SUBCUTANEOUS at 12:20

## 2021-08-08 NOTE — PROGRESS NOTES
[facility]     Progress Note    Patient Name: Antonio Persaud  : 1970  MRN: [id number]  Primary Care Physician:  Debora Leonardo APRN  Date of admission: 2021    Subjective   Subjective     Follow-up for COVID-19 infection, viral pneumonia, pneumothorax.     Over last 24 hours, was transferred out of ICU.  Chest tube was clamped.  Continued with Decadron.  Remains on high flow nasal oxygen.     This morning....  On 5 L oxygen via nasal cannula.  Pt is awake and alert.  Out of bed to chair.  Feels better.  Chest tube clamped.  Chest x-ray without pneumothorax.  Has no fever or chills.  No significant chest pain.  Has some cough, producing minimal phlegm.      ROS:  General:  Fatigue, No Fever  HEENT: No dysphagia, No Visual Changes, no rhinorrhea  Respiratory:  + cough,+Dyspnea, phlegm, No Pleuritic Pain, no wheezing  Cardiovascular: No chest pain, no palpitations,+CR, No Chest Pressure  Gastrointestinal:  No Abdominal Pain, No Nausea, No Vomiting, No Diarrhea  Genitourinary:  No Dysuria, No Frequency  Musculoskeletal: No muscle pain or swelling  Endocrine:  No Heat Intolerance, No Cold Intolerance, Fatigue  Hematologic:  No Bleeding, No Bruising  Psychiatric:  No Anxiety, No Depression  Neurologic:  No Confusion, no Dysarthria, No Headaches  Skin:  No Rash, No Open Wounds      Objective   Objective     Vitals:   Temp:  [97.2 °F (36.2 °C)-97.8 °F (36.6 °C)] 97.7 °F (36.5 °C)  Heart Rate:  [] 105  Resp:  [15-24] 16  BP: (132-154)/() 132/102  Flow (L/min):  [5-7] 5    Physical Exam   Vital Signs Reviewed  General ill-appearing male, awake and alert on Airvo, mild increased work of breathing  HEENT:  PERRL, EOMI.  OP, nares clear  Neck:  Supple, no JVD, no thyromegaly  Chest: Diminished breath sounds bilaterally, equal rise and fall of chest  Chest wall: Right-sided chest tube clamped, minimal tenderness around chest tube site  CV: RRR, no MGR, pulses 2+, equal.  Abd:  Soft, NT, ND, +  BS, no HSM  EXT:  no clubbing, no cyanosis, no edema  Neuro:  A&Ox3, CN grossly intact, no focal deficits  Skin: No rashes or lesions noted      Result Review    Result Review:  I have personally reviewed the results from the time of this admission to 8/8/2021 15:01 EDT and agree with these findings:  [x]  Laboratory  [x]  Microbiology  [x]  Radiology  [x]  EKG/Telemetry   [x]  Cardiology/Vascular   []  Pathology  [x]  Old records  []  Other:  Most notable findings include: Chest x-ray this morning with no pneumothorax.    Assessment/Plan   Assessment / Plan     Brief Patient Summary:  Antonio Persaud is a 51 y.o. male who critically ill in intensive care unit with ARDS secondary to COVID-19 complicated by pneumothorax    Active Hospital Problems:  Active Hospital Problems    Diagnosis    • Cytokine release syndrome, grade 1    • Acute hypoxemic respiratory failure due to COVID-19 (CMS/HCC)    • Acute respiratory distress syndrome (ARDS) due to 2019 novel coronavirus (CMS/HCC)    • Pneumonia due to COVID-19 virus    • Hyponatremia    • Acute kidney injury (CMS/HCC)    • Transaminitis    • Pneumonia of both lungs due to infectious organism    • Hypothyroidism    • Acute respiratory failure with hypoxia (CMS/HCC)        Plan:   Continue supplemental O2 as needed keep O2 saturations greater than 88%.  Currently on high flow nasal oxygen, wean oxygen as tolerated.    Continue breathing treatments and BPH protocol.  Continue to encourage self proning  Continue to trend COVID-19 inflammatory cooeydq-O-cjdze, CRP and ferritin.  Continue Decadron 10 mg twice daily with plan to complete 10-day course  Completed remdesivir course. Has received actemra x 2.  Under aseptic condition, chest tube was removed at bedside.  Continue to hold diuretics for now.  Serum creatinine improved today.  Seems euvolemic.  Monitor accurate I&Os  Trend renal panel, LFTs and electrolytes  Will replace electrolytes as needed  Tussinex as needed  for cough management  Continue Lovenox dose to weightbase therapeutic dose secondary to increasing D-dimer     DVT prophylaxis:  Medical DVT prophylaxis orders are present.    CODE STATUS:    Code Status: CPR  Medical Interventions (Level of Support Prior to Arrest): Full      Reviewed labs, imaging and provider notes.  Discussed with bedside nurse and primary service.

## 2021-08-08 NOTE — DISCHARGE SUMMARY
[facility]         HOSPITALIST  DISCHARGE SUMMARY    Patient Name: Antonio Persaud  : 1970  MRN: [id number]    Date of Admission: 2021  Date of Discharge: 2021  Primary Care Physician: Debora Leonardo APRN    Consults     Date and Time Order Name Status Description    2021 12:36 AM Inpatient Pulmonology Consult      2021  9:33 PM Hospitalist (on-call MD unless specified) Completed           Active and Resolved Hospital Problems:  Active Hospital Problems    Diagnosis POA   • Cytokine release syndrome, grade 1 [D89.831] Unknown   • Acute hypoxemic respiratory failure due to COVID-19 (CMS/HCC) [U07.1, J96.01] Yes   • Acute respiratory distress syndrome (ARDS) due to 2019 novel coronavirus (CMS/HCC) [U07.1, J80] Yes   • Pneumonia due to COVID-19 virus [U07.1, J12.82] Yes   • Hyponatremia [E87.1] Yes   • Acute kidney injury (CMS/HCC) [N17.9] Yes   • Transaminitis [R74.01] Yes   • Pneumonia of both lungs due to infectious organism [J18.9] Yes   • Hypothyroidism [E03.9] Yes   • Acute respiratory failure with hypoxia (CMS/HCC) [J96.01] Yes      Resolved Hospital Problems   No resolved problems to display.       Hospital Course     Hospital Course:  Antonio Persaud is a 51 y.o. male who presented with a chief complaint of shortness of breath.  Patient subsequently diagnosed with COVID-19 pneumonia and was started on the severe and Decadron.  Patient was eval by pulmonary critical care as he required ICU monitoring while admitted.  His course was complicated by right-sided pneumothorax and required thoracotomy tube placement.  Patient's pneumothorax subsequently resolved and chest tube was removed without complication.  Patient unable to wean off oxygen therefore he will be discharged with supplemental oxygen at discharge.  He will be given a nebulizer with bronchodilator treatments.  Patient completed course of antibiotics while admitted for multifocal pneumonia.  Patient  required intermittent diuresis and will be given a prescription for Lasix to take as needed for shortness of breath or edema discharge. Upon discharge patient will follow up with PCP 1 week.  He will follow-up with pulmonary critical care for further recommendations.  We will continue his other home medications as before.  At the time of discharge patient had no chest pain, chest pressure, palpitations, fever, or chills.  He was discharged in stable condition.        DISCHARGE Follow Up Recommendations for labs and diagnostics: PCP 1 week.  Pulmonary schedule.      Day of Discharge     Vital Signs:  Temp:  [97.2 °F (36.2 °C)-97.8 °F (36.6 °C)] 97.7 °F (36.5 °C)  Heart Rate:  [] 105  Resp:  [15-24] 16  BP: (132-154)/() 132/102  Flow (L/min):  [5-7] 5  Physical Exam:   Constitutional: Alert, awake, no acute distress  HEENT:  PERRLA, EOMI; No Scleral icterus  Neck:  Supple; No Mass, No Lymphadenopathy  Cardiovascular:  No Rubs, No Edema, No JVD  Respiratory: No respiratory distress, unlabored breathing, saturating well on nasal cannula  Abdomen:  Normal BS all 4 Quadrants; No Guarding, No Tenderness  Musculoskeletal:  Pulses Positive all 4 Ext; No Cyanosis, No Edema  Neurological:  Alert+Ox3, Mental status WNL; No Dysarthria  Skin:  No Rash, No Cellulitis, No Erythema      Discharge Details        Discharge Medications      New Medications      Instructions Start Date   arformoterol 15 MCG/2ML nebulizer solution  Commonly known as: BROVANA   15 mcg, Nebulization, 2 Times Daily - RT      benzonatate 100 MG capsule  Commonly known as: TESSALON   100 mg, Oral, 3 Times Daily PRN      budesonide 0.5 MG/2ML nebulizer solution  Commonly known as: PULMICORT   0.5 mg, Nebulization, 2 Times Daily - RT      dexamethasone 4 MG tablet  Commonly known as: DECADRON   6 mg, Oral, Daily      furosemide 20 MG tablet  Commonly known as: Lasix   20 mg, Oral, Every 48 Hours PRN      ipratropium-albuterol 0.5-2.5 mg/3 ml  nebulizer  Commonly known as: DUO-NEB   3 mL, Nebulization, Every 6 Hours While Awake - RT         Continue These Medications      Instructions Start Date   esomeprazole 20 MG capsule  Commonly known as: nexIUM   20 mg, Oral, Every Morning Before Breakfast      levothyroxine 125 MCG tablet  Commonly known as: SYNTHROID, LEVOTHROID   125 mcg, Oral, Daily             Allergies   Allergen Reactions   • Codeine GI Intolerance       [place] Disposition:  [place] [facility]iet:  Hospital:  Diet Order   Procedures   • Diet Regular       Discharge Activity:       CODE STATUS:  Code Status and Medical Interventions:   Ordered at: 07/30/21 3499     Code Status:    CPR     Medical Interventions (Level of Support Prior to Arrest):    Full         No future appointments.        Pertinent  and/or Most Recent Results     PROCEDURES:   Right thoracotomy tube placement    LAB RESULTS:      Lab 08/08/21  0459 08/07/21  0405 08/06/21  0303 08/05/21  0313 08/04/21  0302 08/03/21  0237 08/03/21  0237 08/02/21  0321 08/02/21  0321   WBC 16.38* 16.05* 15.49* 13.42* 12.48*   < > 9.94   < > 12.26*   HEMOGLOBIN 17.8* 16.9 17.1 16.9 16.8   < > 16.9   < > 16.5   HEMATOCRIT 52.1* 50.4 49.4 49.0 49.6   < > 50.0   < > 47.9   PLATELETS 197 224 215 222 239   < > 298   < > 333   NEUTROS ABS 12.51* 12.38*  --  10.91* 10.86*  --  8.73*   < > 10.55*   IMMATURE GRANS (ABS) 1.32* 1.38*  --  1.01*  --   --  0.27*  --  0.19*   LYMPHS ABS 1.24 1.07  --  0.67*  --   --  0.46*  --  0.75   MONOS ABS 1.29* 1.10*  --  0.72  --   --  0.44  --  0.74   EOS ABS 0.00 0.01  --  0.01  --   --  0.00  --  0.00   MCV 87.9 87.8 86.2 86.6 86.0   < > 86.2   < > 86.3   CRP  --   --   --   --  0.81*  --  1.45*  --  2.44*   *  --  653*  --  680*  --   --   --  606*    < > = values in this interval not displayed.         Lab 08/08/21  0459 08/07/21  0405 08/06/21  0303 08/05/21  0313 08/04/21  0302   SODIUM 137 140 142 138 140   POTASSIUM 4.6 4.7 4.4 4.3 4.3    CHLORIDE 100 102 106 104 103   CO2 26.3 28.9 26.7 24.8 26.4   ANION GAP 10.7 9.1 9.3 9.2 10.6   BUN 34* 34* 28* 24* 20   CREATININE 0.93 1.13 0.91 1.00 0.96   GLUCOSE 129* 135* 134* 126* 150*   CALCIUM 9.0 8.8 8.9 8.8 8.6   MAGNESIUM 2.3 2.3 2.3 2.2 2.2   PHOSPHORUS 4.9* 5.6* 4.9* 4.1 4.2         Lab 08/08/21  0459 08/07/21  0405 08/06/21  0303 08/05/21  0313 08/04/21  0302   TOTAL PROTEIN 5.9* 5.8* 5.6* 5.5* 5.6*   ALBUMIN 3.70 3.40* 3.20* 3.20* 3.20*   GLOBULIN 2.2 2.4 2.4 2.3 2.4   ALT (SGPT) 106* 98* 86* 95* 95*   AST (SGOT) 39 38 41* 52* 57*   BILIRUBIN 0.8 0.6 0.7 0.6 0.6   BILIRUBIN DIRECT 0.2 0.2 0.2 0.2 0.2   ALK PHOS 87 93 100 107 102         Lab 08/03/21  0237   PROBNP 119.1             Lab 08/04/21  0302   FERRITIN 1,766.00*         Brief Urine Lab Results     None        Microbiology Results (last 10 days)     Procedure Component Value - Date/Time    Legionella Antigen, Urine - Urine, Urine, Clean Catch [[id number]]  (Normal) Collected: 07/31/21 0015    Lab Status: Final result Specimen: Urine, Clean Catch Updated: 07/31/21 0722     LEGIONELLA ANTIGEN, URINE Negative    S. Pneumo Ag Urine or CSF - Urine, Urine, Clean Catch [[id number]]  (Normal) Collected: 07/31/21 0015    Lab Status: Final result Specimen: Urine, Clean Catch Updated: 07/31/21 0723     Strep Pneumo Ag Negative    Blood Culture - Blood, Arm, Right [[id number]] Collected: 07/30/21 1948    Lab Status: Final result Specimen: Blood from Arm, Right Updated: 08/04/21 2000     Blood Culture No growth at 5 days    Blood Culture - Blood, Arm, Right [[id number]] Collected: 07/30/21 1948    Lab Status: Final result Specimen: Blood from Arm, Right Updated: 08/04/21 2000     Blood Culture No growth at 5 days                           Labs Pending at Discharge:        Time spent on Discharge including face to face service: 32 minutes    Electronically signed by Alec Ta DO, 08/08/21, 1:32 PM EDT.

## 2021-08-08 NOTE — PLAN OF CARE
Goal Outcome Evaluation:           Progress: improving  Outcome Summary: pt resting in bed, 7L NC in place, o2 sats 92%, chest tube intact to right side with no suction. pt coughing, tussinex given.

## 2021-08-08 NOTE — SIGNIFICANT NOTE
08/08/21 1442   Plan   Final Discharge Disposition Code 01 - home or self-care   Final Note Patient to discharge home today. SW called patients cell as he was not answering the room phone. Pt denied any discharge needs other than home oxygen. MD put in orders for home oxygen and nebulizer. SW sent referral to [employer] and pt went hoem with both items.

## 2021-08-08 NOTE — SIGNIFICANT NOTE
Dr. berman removes pt's right chest tube. Cleansed with NS and gauze. Dry 4x4 gauze and covers with large tagaderm applied. Pt tolerates well.

## 2021-08-08 NOTE — DISCHARGE INSTR - APPOINTMENTS
Please call for appointment with Pulmonary critical care and sleep medicine [phone]. Dr. Cheng    Please call PCP for follow up

## 2021-08-08 NOTE — NURSING NOTE
Exercise Oximetry    Patient Name:Antonio Persaud   MRN: [id number]   Date: 08/08/21             ROOM AIR BASELINE   SpO2% 88   Heart Rate 108   Blood Pressure 166/112     EXERCISE ON ROOM AIR SpO2% EXERCISE ON O2 @ 4  LPM SpO2%   1 MINUTE  1 MINUTE 90   2 MINUTES  2 MINUTES 88   3 MINUTES  3 MINUTES 86   4 MINUTES  4 MINUTES    5 MINUTES  5 MINUTES    6 MINUTES  6 MINUTES               Distance Walked   Distance Walked  150 feet   Dyspnea (Lo Scale)   Dyspnea (Lo Scale)   Fatigue (Lo Scale)   Fatigue (Lo Scale)   SpO2% Post Exercise   SpO2% Post Exercise 93%   HR Post Exercise   HR Post Exercise 110   Time to Recovery   Time to Recovery 4 min     Comments:

## 2021-08-09 ENCOUNTER — READMISSION MANAGEMENT (OUTPATIENT)
Dept: CALL CENTER | Facility: HOSPITAL | Age: 51
End: 2021-08-09

## 2021-08-09 NOTE — OUTREACH NOTE
COVID-19 Week 1 Survey      Responses   [facility] patient discharged from?  [facility]   Does the patient have one of the following disease processes/diagnoses(primary or secondary)?  COVID-19   COVID-19 underlying condition?  None   Call Number  Call 1   Week 1 Call successful?  Yes   Call start time  1137   Call end time  1145   Discharge diagnosis  Pneumonia due to COVID-19 virus   Is patient permission given to speak with other caregiver?  Yes   List who call center can speak with  wife   Meds reviewed with patient/caregiver?  Yes   Is the patient having any side effects they believe may be caused by any medication additions or changes?  No   Does the patient have all medications ordered at discharge?  No   What is keeping the patient from filling the prescriptions?  -- [Pt's pharmacy was closed, planning on picking up today.]   Nursing Interventions  Nurse provided patient education   Is the patient taking all medications as directed (includes completed medication regime)?  No   What is preventing the patient from taking all medications as directed?  Other   Nursing Interventions  Nurse provided patient education   Medication comments  Pt is picking prescriptions up today, he reports.   Does the patient have a primary care provider?   Yes   Does the patient have an appointment with their PCP or specialist within 7 days of discharge?  No   What is preventing the patient from scheduling follow up appointments within 7 days of discharge?  Haven't had time   Nursing Interventions  Educated patient on importance of making appointment   What DME was ordered?  [employer] for home O2 and nebulizer   Has all DME been delivered?  Yes   DME comments  wearing home O2 and using nebs.   Psychosocial issues?  No   Comments  Son is EMT and monitoring O2 sats until pt can get his own pulse ox. Pt is resting at home doing well.    Did the patient receive a copy of their discharge instructions?  Yes   Did the patient receive a  copy of COVID-19 specific instructions?  Yes   Nursing interventions  Reviewed instructions with patient   What is the patient's perception of their health status since discharge?  Same   Does the patient have any of the following symptoms?  Shortness of breath, Cough, Loss of taste/smell [cough is occas productive.]   Nursing Interventions  Nurse provided patient education   Pulse Ox monitoring  Intermittent   Pulse Ox device source  Other [son is an EMT and checks it periodically.]   O2 Sat comments  97% with O2 in place.   O2 Sat: education provided  Sat levels   O2 Sat education comments  Keep O2 sats above 92%   Is the patient/caregiver able to teach back steps to recovery at home?  Set small, achievable goals for return to baseline health, Rest and rebuild strength, gradually increase activity   If the patient is a current smoker, are they able to teach back resources for cessation?  Not a smoker   Is the patient/caregiver able to teach back the hierarchy of who to call/visit for symptoms/problems? PCP, Specialist, Home health nurse, Urgent Care, ED, 911  Yes   COVID-19 call completed?  Yes          Magdalena Mendez RN

## 2021-08-09 NOTE — OUTREACH NOTE
Prep Survey      Responses   [facility] facility patient discharged from?  [facility]   Is LACE score < 7 ?  No   Emergency Room discharge w/ pulse ox?  No   Eligibility  Readm Mgmt   Discharge diagnosis  Pneumonia due to COVID-19 virus   Does the patient have one of the following disease processes/diagnoses(primary or secondary)?  COVID-19   Does the patient have Home health ordered?  No   Is there a DME ordered?  Yes   What DME was ordered?  [facility] for home O2 and nebulizer   Prep survey completed?  Yes          Yvonne Johnson RN

## 2021-08-10 ENCOUNTER — READMISSION MANAGEMENT (OUTPATIENT)
Dept: CALL CENTER | Facility: HOSPITAL | Age: 51
End: 2021-08-10

## 2021-08-10 NOTE — OUTREACH NOTE
COVID-19 Week 1 Survey      Responses   [facility] patient discharged from?  [facility]   Does the patient have one of the following disease processes/diagnoses(primary or secondary)?  COVID-19   COVID-19 underlying condition?  None   Call Number  Call 2   Week 1 Call successful?  Yes   Call start time  1314   Call end time  1319   Discharge diagnosis  Pneumonia due to COVID-19 virus   Has the patient kept scheduled appointments due by today?  N/A   Comments  Has appt on Friday   What DME was ordered?  [facility] for home O2 and nebulizer   DME comments  wearing home O2 --4l and using nebs.   Psychosocial issues?  No   Nursing Interventions  Nurse provided patient education   Pulse Ox monitoring  Intermittent   Pulse Ox device source  Other   O2 Sat comments  High 90's --oxygen in place AAT un less he goes to the bathroom   O2 Sat: education provided  Sat levels, When to seek care   O2 Sat education comments  Keep O2 sats above 90%   Is the patient/caregiver able to teach back steps to recovery at home?  Set small, achievable goals for return to baseline health, Rest and rebuild strength, gradually increase activity   Is the patient/caregiver able to teach back the hierarchy of who to call/visit for symptoms/problems? PCP, Specialist, Home health nurse, Urgent Care, ED, 911  Yes   COVID-19 call completed?  Yes          Corine Phelps RN

## 2021-08-11 ENCOUNTER — READMISSION MANAGEMENT (OUTPATIENT)
Dept: CALL CENTER | Facility: HOSPITAL | Age: 51
End: 2021-08-11

## 2021-08-11 NOTE — OUTREACH NOTE
COVID-19 Week 1 Survey      Responses   [facility] patient discharged from?  [place]   Does the patient have one of the following disease processes/diagnoses(primary or secondary)?  COVID-19   COVID-19 underlying condition?  None   Call Number  Call 3   Week 1 Call successful?  Yes   Call start time  1149   Call end time  1206   Discharge diagnosis  Pneumonia due to COVID-19 virus   Is patient permission given to speak with other caregiver?  Yes   List who call center can speak with  wife   Person spoke with today (if not patient) and relationship  patient   Meds reviewed with patient/caregiver?  Yes   Does the patient have all medications ordered at discharge?  Yes   Is the patient taking all medications as directed (includes completed medication regime)?  Yes   Medication comments  Patient has taken the lasix each day since getting home. It is ordered on prn basis. He reports that he does not feel like he is swollen or holding extra fluid today. Advised to take on prn basis.    Does the patient have a primary care provider?   Yes   Comments regarding PCP  Debora Leonardo, APRN,  Friday 8/13/21   Does the patient have an appointment with their PCP or specialist within 7 days of discharge?  Yes   Has the patient kept scheduled appointments due by today?  N/A   Comments  Patient to count 20 days from onset of symptoms and if quarantine not over, he will contact PCP to convert to telehealth appt.    Has home health visited the patient within 72 hours of discharge?  N/A   What DME was ordered?  [employer] for home O2 and nebulizer   Has all DME been delivered?  Yes   DME comments  wearing home O2 --4l and using nebs.   Psychosocial issues?  No   Did the patient receive a copy of their discharge instructions?  Yes   Did the patient receive a copy of COVID-19 specific instructions?  Yes   Nursing interventions  Reviewed instructions with patient   What is the patient's perception of their health status since  discharge?  Improving   Does the patient have any of the following symptoms?  Cough, Shortness of breath, Loss of taste/smell [taste is returning, no smell. ]   Nursing Interventions  Nurse provided patient education   Pulse Ox monitoring  Intermittent   Pulse Ox device source  Patient   O2 Sat comments  Patient reports sats improving 93-97% with wearing O24L.    O2 Sat: education provided  Sat levels, Monitoring frequency, When to seek care   O2 Sat education comments  Informed to return to ER if sats stay below 92% with wearing O2, using neb, resting, doing deep breath exercises.    Is the patient/caregiver able to teach back steps to recovery at home?  Set small, achievable goals for return to baseline health, Rest and rebuild strength, gradually increase activity, Eat a well-balance diet   If the patient is a current smoker, are they able to teach back resources for cessation?  Not a smoker   Is the patient/caregiver able to teach back the hierarchy of who to call/visit for symptoms/problems? PCP, Specialist, Home health nurse, Urgent Care, ED, 911  Yes   COVID-19 call completed?  Yes   Wrap up additional comments  Discussed 20 day quarantine from onset of symptoms with patient being in severe symptom category. Patient states that he will check his calendar and figure out ending date. Has not heard from health dept since discharge.           Yanira Cruz RN

## 2021-08-17 ENCOUNTER — READMISSION MANAGEMENT (OUTPATIENT)
Dept: CALL CENTER | Facility: HOSPITAL | Age: 51
End: 2021-08-17

## 2021-08-17 NOTE — OUTREACH NOTE
COVID-19 Week 2 Survey      Responses   [facility] patient discharged from?  [place]   Does the patient have one of the following disease processes/diagnoses(primary or secondary)?  COVID-19   COVID-19 underlying condition?  None   Call Number  Call 1   COVID-19 Week 2: Call 1 attempt successful?  Yes   Call start time  1147   Call end time  1150   Discharge diagnosis  Pneumonia due to COVID-19 virus   Meds reviewed with patient/caregiver?  Yes   Is the patient having any side effects they believe may be caused by any medication additions or changes?  No   Does the patient have all medications ordered at discharge?  Yes   Is the patient taking all medications as directed (includes completed medication regime)?  Yes   Does the patient have a primary care provider?   Yes   Comments regarding PCP  Aw PCP on Friday video visit   Does the patient have an appointment with their PCP or specialist within 7 days of discharge?  Yes   Has the patient kept scheduled appointments due by today?  N/A   Has home health visited the patient within 72 hours of discharge?  N/A   What DME was ordered?  [facility] for home O2 and nebulizer   Has all DME been delivered?  Yes   Did the patient receive a copy of their discharge instructions?  Yes   Did the patient receive a copy of COVID-19 specific instructions?  Yes   Nursing interventions  Reviewed instructions with patient   What is the patient's perception of their health status since discharge?  Improving   Does the patient have any of the following symptoms?  Shortness of breath [some things he cantaste]   Nursing Interventions  Nurse provided patient education   Pulse Ox monitoring  Intermittent   Pulse Ox device source  Patient   O2 Sat comments  97% on 2L   O2 Sat: education provided  Sat levels, Monitoring frequency, When to seek care   O2 Sat education comments  Informed to return to ER if sats stay below 92% with wearing O2, using neb, resting, doing deep breath exercises.     Is the patient/caregiver able to teach back steps to recovery at home?  Set small, achievable goals for return to baseline health, Rest and rebuild strength, gradually increase activity, Eat a well-balance diet   If the patient is a current smoker, are they able to teach back resources for cessation?  Not a smoker   Is the patient/caregiver able to teach back the hierarchy of who to call/visit for symptoms/problems? PCP, Specialist, Home health nurse, Urgent Care, ED, 911  Yes   COVID-19 call completed?  Yes   Wrap up additional comments  States he is doing better, some things he can taste now. No need identified.           Yanira Brown, RN

## 2021-08-24 ENCOUNTER — READMISSION MANAGEMENT (OUTPATIENT)
Dept: CALL CENTER | Facility: HOSPITAL | Age: 51
End: 2021-08-24

## 2021-08-24 NOTE — OUTREACH NOTE
COVID-19 Week 3 Survey      Responses   [facility] patient discharged from?  [place]   Does the patient have one of the following disease processes/diagnoses(primary or secondary)?  COVID-19   COVID-19 underlying condition?  None   Call Number  Call 1   COVID-19 Week 3: Call 1 attempt successful?  Yes   Call start time  0958   Call end time  1006   Discharge diagnosis  Pneumonia due to COVID-19 virus   Is patient permission given to speak with other caregiver?  Yes   Meds reviewed with patient/caregiver?  Yes   Is the patient having any side effects they believe may be caused by any medication additions or changes?  No   Does the patient have all medications ordered at discharge?  Yes   Is the patient taking all medications as directed (includes completed medication regime)?  Yes   Does the patient have a primary care provider?   Yes   Does the patient have an appointment with their PCP or specialist within 7 days of discharge?  Yes   Has the patient kept scheduled appointments due by today?  Yes   Has home health visited the patient within 72 hours of discharge?  N/A   What DME was ordered?  02 at 2 [facility]    Has all DME been delivered?  Yes   DME comments  02 at 2 [facility]   Psychosocial issues?  No   Did the patient receive a copy of their discharge instructions?  Yes   Did the patient receive a copy of COVID-19 specific instructions?  Yes   Nursing interventions  Reviewed instructions with patient   What is the patient's perception of their health status since discharge?  Improving   Does the patient have any of the following symptoms?  Shortness of breath, Cough   Nursing Interventions  Nurse provided patient education   Pulse Ox monitoring  Intermittent   Pulse Ox device source  Patient   O2 Sat comments  97% on 2L   O2 Sat: education provided  Sat levels, Monitoring frequency, When to seek care   Is the patient/caregiver able to teach back steps to recovery at home?  Set small, achievable goals for return to  baseline health, Rest and rebuild strength, gradually increase activity, Make a list of questions for provider's appointment   If the patient is a current smoker, are they able to teach back resources for cessation?  Not a smoker   Is the patient/caregiver able to teach back the hierarchy of who to call/visit for symptoms/problems? PCP, Specialist, Home health nurse, Urgent Care, ED, 911  Yes   COVID-19 call completed?  Yes   Wrap up additional comments  States he is doing better, some things he can taste now. No need identified.           Dax Issa RN

## 2021-08-31 ENCOUNTER — READMISSION MANAGEMENT (OUTPATIENT)
Dept: CALL CENTER | Facility: HOSPITAL | Age: 51
End: 2021-08-31

## 2021-08-31 NOTE — OUTREACH NOTE
COVID-19 Week 4 Survey      Responses   [facility] patient discharged from?  [place]   Does the patient have one of the following disease processes/diagnoses(primary or secondary)?  COVID-19   COVID-19 underlying condition?  None   Call Number  Call 1   COVID-19 Week 4: Call 1 attempt successful?  Yes   Call start time  1132   Call end time  1135   Discharge diagnosis  Pneumonia due to COVID-19 virus   Meds reviewed with patient/caregiver?  Yes   Is the patient having any side effects they believe may be caused by any medication additions or changes?  No   Does the patient have all medications ordered at discharge?  Yes   Is the patient taking all medications as directed (includes completed medication regime)?  Yes   Has the patient kept scheduled appointments due by today?  Yes   Is the patient still receiving Home Health Services?  N/A   DME comments  PATIENT STATES HE IS STILL USING O2 PRN   What is the patient's perception of their health status since discharge?  Improving   Does the patient have any of the following symptoms?  Shortness of breath   Nursing Interventions  Nurse provided patient education   Pulse Ox monitoring  Intermittent   Pulse Ox device source  Patient   O2 Sat: education provided  Sat levels, Monitoring frequency, When to seek care   Is the patient/caregiver able to teach back steps to recovery at home?  Set small, achievable goals for return to baseline health, Rest and rebuild strength, gradually increase activity   If the patient is a current smoker, are they able to teach back resources for cessation?  Not a smoker   Is the patient/caregiver able to teach back the hierarchy of who to call/visit for symptoms/problems? PCP, Specialist, Home health nurse, Urgent Care, ED, 911  Yes   Is the patient interested in additional calls from an ambulatory ?  NOTE:  applies to high risk patients requiring additional follow-up.  No   Did the patient feel the follow up calls were  helpful during their recovery period?  Yes   Was the number of calls appropriate?  Yes   Interested in COVID-19 Plasma Donation?  Uncertain          Anne-Marie Sierra LPN

## 2022-10-17 ENCOUNTER — OFFICE VISIT (OUTPATIENT)
Dept: ORTHOPEDIC SURGERY | Facility: CLINIC | Age: 52
End: 2022-10-17

## 2022-10-17 VITALS — HEIGHT: 70 IN | WEIGHT: 244 LBS | OXYGEN SATURATION: 99 % | BODY MASS INDEX: 34.93 KG/M2 | HEART RATE: 70 BPM

## 2022-10-17 DIAGNOSIS — M75.52 BURSITIS OF LEFT SHOULDER: ICD-10-CM

## 2022-10-17 DIAGNOSIS — M19.012 ARTHRITIS OF LEFT ACROMIOCLAVICULAR JOINT: ICD-10-CM

## 2022-10-17 DIAGNOSIS — M25.512 LEFT SHOULDER PAIN, UNSPECIFIED CHRONICITY: Primary | ICD-10-CM

## 2022-10-17 PROCEDURE — 99203 OFFICE O/P NEW LOW 30 MIN: CPT | Performed by: ORTHOPAEDIC SURGERY

## 2022-10-17 PROCEDURE — 20610 DRAIN/INJ JOINT/BURSA W/O US: CPT | Performed by: ORTHOPAEDIC SURGERY

## 2022-10-17 RX ORDER — TRIAMCINOLONE ACETONIDE 40 MG/ML
40 INJECTION, SUSPENSION INTRA-ARTICULAR; INTRAMUSCULAR
Status: COMPLETED | OUTPATIENT
Start: 2022-10-17 | End: 2022-10-17

## 2022-10-17 RX ORDER — LIDOCAINE HYDROCHLORIDE 10 MG/ML
5 INJECTION, SOLUTION INFILTRATION; PERINEURAL
Status: COMPLETED | OUTPATIENT
Start: 2022-10-17 | End: 2022-10-17

## 2022-10-17 RX ADMIN — LIDOCAINE HYDROCHLORIDE 5 ML: 10 INJECTION, SOLUTION INFILTRATION; PERINEURAL at 14:25

## 2022-10-17 RX ADMIN — TRIAMCINOLONE ACETONIDE 40 MG: 40 INJECTION, SUSPENSION INTRA-ARTICULAR; INTRAMUSCULAR at 14:25

## 2022-10-17 NOTE — PROGRESS NOTES
"Chief Complaint  Initial Evaluation of the Left Shoulder     Subjective      Antonio Persaud presents to [facility] for an evaluation of left shoulder. He states burning pains in the shoulder that radiates down the arm and into the neck. He denies any neck pain and has good motion of the neck. He notices pain with motion of the shoulder. He also notices some numbness and tingling.     Allergies   Allergen Reactions   • Codeine GI Intolerance        Social History     Socioeconomic History   • Marital status:    Tobacco Use   • Smoking status: Never   • Smokeless tobacco: Never   Vaping Use   • Vaping Use: Never used   Substance and Sexual Activity   • Alcohol use: Never   • Drug use: Defer   • Sexual activity: Defer        Review of Systems     Objective   Vital Signs:   Pulse 70   Ht 177.8 cm (70\")   Wt 111 kg (244 lb)   SpO2 99%   BMI 35.01 kg/m²       Physical Exam  Constitutional:       Appearance: Normal appearance. Patient is well-developed and normal weight.   HENT:      Head: Normocephalic.      Right Ear: Hearing and external ear normal.      Left Ear: Hearing and external ear normal.      Nose: Nose normal.   Eyes:      Conjunctiva/sclera: Conjunctivae normal.   Cardiovascular:      Rate and Rhythm: Normal rate.   Pulmonary:      Effort: Pulmonary effort is normal.      Breath sounds: No wheezing or rales.   Abdominal:      Palpations: Abdomen is soft.      Tenderness: There is no abdominal tenderness.   Musculoskeletal:      Cervical back: Normal range of motion.   Skin:     Findings: No rash.   Neurological:      Mental Status: Patient  is alert and oriented to person, place, and time.   Psychiatric:         Mood and Affect: Mood and affect normal.         Judgment: Judgment normal.       Ortho Exam      LEFT SHOULDER: Full forward elevation. Good tone of deltoid, biceps, triceps, wrist extensors, and wrist flexors.  Sensation grossly intact. Neurovascular " intact.  IR to L1. Full cross body adduction with pain. Full wrist extension, full wrist flexion, full , full thumb opposition. Full PIP flexors, full DIP flexors, full PIP extensors. No swelling, skin discoloration or atrophy. Full elbow flexion and extension. Positive impingement. Pain with forward elevation.       Large Joint Arthrocentesis: Left Shoulder  Date/Time: 10/17/2022 2:25 PM  Consent given by: patient  Site marked: site marked  Timeout: Immediately prior to procedure a time out was called to verify the correct patient, procedure, equipment, support staff and site/side marked as required   Supporting Documentation  Indications: pain   Procedure Details  Location: shoulder (left shoulder) -   Needle size: 22 G  Medications administered: 5 mL lidocaine 1 %; 40 mg triamcinolone acetonide 40 MG/ML  Patient tolerance: patient tolerated the procedure well with no immediate complications              Imaging Results (Most Recent)     Procedure Component Value Units Date/Time    XR Scapula Left [[id number]] Resulted: 10/17/22 1400     Updated: 10/17/22 1404           Result Review :     X-Ray Report:  Left scapula(s) X-Ray  Indication: Evaluation of left shoulder pain   AP and Lateral view(s)  Findings: No acute fractures or dislocation. AC joint arthritis, no glenohumeral arthritis.   Prior studies available for comparison: no          Assessment and Plan     Diagnoses and all orders for this visit:    1. Left shoulder pain, unspecified chronicity (Primary)  -     XR Scapula Left    2. Arthritis of left acromioclavicular joint    3. Bursitis of left shoulder      Plan for left shoulder injection, patient given left shoulder steroid injection, he tolerated this well.       Call or return if worsening symptoms.    Follow Up     4-6 weeks       Patient was given instructions and counseling regarding his condition or for health maintenance advice. Please see specific information pulled into the AVS if  appropriate.     Scribed for Son Harris MD by Mary Doe.  10/17/22   14:12 EDT    I have personally performed the services described in this document as scribed by the above individual and it is both accurate and complete. Son Harris MD 10/17/22

## 2022-11-21 ENCOUNTER — OFFICE VISIT (OUTPATIENT)
Dept: ORTHOPEDIC SURGERY | Facility: CLINIC | Age: 52
End: 2022-11-21

## 2022-11-21 VITALS — HEIGHT: 70 IN | BODY MASS INDEX: 34.93 KG/M2 | WEIGHT: 244 LBS

## 2022-11-21 DIAGNOSIS — M19.012 ARTHRITIS OF LEFT ACROMIOCLAVICULAR JOINT: ICD-10-CM

## 2022-11-21 DIAGNOSIS — M25.512 LEFT SHOULDER PAIN, UNSPECIFIED CHRONICITY: Primary | ICD-10-CM

## 2022-11-21 DIAGNOSIS — M75.52 BURSITIS OF LEFT SHOULDER: ICD-10-CM

## 2022-11-21 PROCEDURE — 99213 OFFICE O/P EST LOW 20 MIN: CPT | Performed by: PHYSICIAN ASSISTANT

## 2022-11-21 NOTE — PATIENT INSTRUCTIONS
Patient with 75% improvement with steroid injection. Advised on 3 month duration between injections. He has no longer had to take ibuprofen, but can, if needed. Continue home exercises.     Follow up as needed. Call with changes or concerns.

## 2022-11-21 NOTE — PROGRESS NOTES
"Chief Complaint  Pain and Follow-up of the Left Shoulder    Subjective      Antonio Persaud presents to [facility] for follow-up of left shoulder pain, left AC joint arthritis, and left shoulder bursitis for which he was initially evaluated in clinic on 10/17/2022 by Dr. Harris and received a left shoulder steroid injection.  He presents today for follow-up, reporting approximate 75% improvement.  Reports that \"most of my pain is gone\".  He has been performing home exercises.    Objective   Allergies   Allergen Reactions   • Codeine GI Intolerance       Vital Signs:   Ht 177.8 cm (70\")   Wt 111 kg (244 lb)   BMI 35.01 kg/m²       Physical Exam    Constitutional: Awake, alert. Well nourished appearance.    Integumentary: Warm, dry, intact. No obvious rashes.    HENT: Atraumatic, normocephalic.   Respiratory: Non labored respirations .   Cardiovascular: Intact peripheral pulses.    Psychiatric: Normal mood and affect. A&O X3    Ortho Exam  Left shoulder: Full active forward flexion and abduction.  Internal rotation to L2.  External rotation to 45 degrees.  Full flexion extension of the wrist and elbow.  Full supination and pronation.  Patient is able to form a full fist.  Sensation intact light touch.  Distal neurovascular intact.  Cross body abduction with reported pain.    Imaging Results (Most Recent)     None              Assessment and Plan   Problem List Items Addressed This Visit    None  Visit Diagnoses     Left shoulder pain, unspecified chronicity    -  Primary    Arthritis of left acromioclavicular joint        Bursitis of left shoulder            Follow Up   Return if symptoms worsen or fail to improve.  Educated on risk of smoking. Discussed options for smoking cessation.    Patient Instructions   Patient with 75% improvement with steroid injection. Advised on 3 month duration between injections. He has no longer had to take ibuprofen, but can, if needed. Continue home " exercises.     Follow up as needed. Call with changes or concerns.     Patient was given instructions and counseling regarding his condition or for health maintenance advice. Please see specific information pulled into the AVS if appropriate.

## 2025-08-14 ENCOUNTER — CLINICAL SUPPORT (OUTPATIENT)
Dept: GASTROENTEROLOGY | Facility: CLINIC | Age: 55
End: 2025-08-14
Payer: COMMERCIAL

## 2025-08-14 DIAGNOSIS — R19.5 POSITIVE COLORECTAL CANCER SCREENING USING COLOGUARD TEST: Primary | ICD-10-CM

## 2025-08-14 RX ORDER — PEG-3350, SODIUM SULFATE, SODIUM CHLORIDE, POTASSIUM CHLORIDE, SODIUM ASCORBATE AND ASCORBIC ACID 7.5-2.691G
1000 KIT ORAL TAKE AS DIRECTED
Qty: 1000 ML | Refills: 0 | Status: SHIPPED | OUTPATIENT
Start: 2025-08-14

## 2025-08-14 RX ORDER — LISINOPRIL 20 MG/1
TABLET ORAL
COMMUNITY

## 2025-08-14 RX ORDER — AMLODIPINE BESYLATE 5 MG/1
TABLET ORAL
COMMUNITY
Start: 2025-06-10

## 2025-08-14 RX ORDER — OMEPRAZOLE 40 MG/1
CAPSULE, DELAYED RELEASE ORAL
COMMUNITY
Start: 2025-07-21

## 2025-08-15 ENCOUNTER — TELEPHONE (OUTPATIENT)
Dept: GASTROENTEROLOGY | Facility: CLINIC | Age: 55
End: 2025-08-15
Payer: COMMERCIAL

## 2025-08-25 ENCOUNTER — TELEPHONE (OUTPATIENT)
Dept: GASTROENTEROLOGY | Facility: CLINIC | Age: 55
End: 2025-08-25
Payer: COMMERCIAL

## 2025-08-25 RX ORDER — PEG-3350, SODIUM SULFATE, SODIUM CHLORIDE, POTASSIUM CHLORIDE, SODIUM ASCORBATE AND ASCORBIC ACID 7.5-2.691G
1000 KIT ORAL TAKE AS DIRECTED
Qty: 1000 ML | Refills: 0 | Status: ON HOLD | OUTPATIENT
Start: 2025-08-25 | End: 2025-08-29

## 2025-08-27 ENCOUNTER — ANESTHESIA EVENT (OUTPATIENT)
Dept: GASTROENTEROLOGY | Facility: HOSPITAL | Age: 55
End: 2025-08-27
Payer: COMMERCIAL

## 2025-08-29 ENCOUNTER — HOSPITAL ENCOUNTER (OUTPATIENT)
Facility: HOSPITAL | Age: 55
Setting detail: HOSPITAL OUTPATIENT SURGERY
Discharge: HOME | End: 2025-08-29
Attending: INTERNAL MEDICINE | Admitting: INTERNAL MEDICINE
Payer: COMMERCIAL

## 2025-08-29 ENCOUNTER — ANESTHESIA (OUTPATIENT)
Dept: GASTROENTEROLOGY | Facility: HOSPITAL | Age: 55
End: 2025-08-29
Payer: COMMERCIAL

## 2025-08-29 VITALS
RESPIRATION RATE: 18 BRPM | WEIGHT: 248.46 LBS | HEIGHT: 71 IN | HEART RATE: 76 BPM | DIASTOLIC BLOOD PRESSURE: 87 MMHG | SYSTOLIC BLOOD PRESSURE: 139 MMHG | BODY MASS INDEX: 34.78 KG/M2 | TEMPERATURE: 97.6 F | OXYGEN SATURATION: 97 %

## 2025-08-29 DIAGNOSIS — R19.5 POSITIVE COLORECTAL CANCER SCREENING USING COLOGUARD TEST: ICD-10-CM

## 2025-08-29 PROCEDURE — 25810000003 LACTATED RINGERS PER 1000 ML: Performed by: NURSE ANESTHETIST, CERTIFIED REGISTERED

## 2025-08-29 PROCEDURE — 25010000002 LIDOCAINE PF 2% 2 % SOLUTION: Performed by: NURSE ANESTHETIST, CERTIFIED REGISTERED

## 2025-08-29 PROCEDURE — 25010000002 PROPOFOL 10 MG/ML EMULSION: Performed by: NURSE ANESTHETIST, CERTIFIED REGISTERED

## 2025-08-29 RX ORDER — SODIUM CHLORIDE, SODIUM LACTATE, POTASSIUM CHLORIDE, CALCIUM CHLORIDE 600; 310; 30; 20 MG/100ML; MG/100ML; MG/100ML; MG/100ML
INJECTION, SOLUTION INTRAVENOUS CONTINUOUS PRN
Status: DISCONTINUED | OUTPATIENT
Start: 2025-08-29 | End: 2025-08-29 | Stop reason: SURG

## 2025-08-29 RX ORDER — PROPOFOL 10 MG/ML
VIAL (ML) INTRAVENOUS AS NEEDED
Status: DISCONTINUED | OUTPATIENT
Start: 2025-08-29 | End: 2025-08-29 | Stop reason: SURG

## 2025-08-29 RX ORDER — LIDOCAINE HYDROCHLORIDE 20 MG/ML
INJECTION, SOLUTION EPIDURAL; INFILTRATION; INTRACAUDAL; PERINEURAL AS NEEDED
Status: DISCONTINUED | OUTPATIENT
Start: 2025-08-29 | End: 2025-08-29 | Stop reason: SURG

## 2025-08-29 RX ADMIN — PROPOFOL 175 MCG/KG/MIN: 10 INJECTION, EMULSION INTRAVENOUS at 08:53

## 2025-08-29 RX ADMIN — SODIUM CHLORIDE, POTASSIUM CHLORIDE, SODIUM LACTATE AND CALCIUM CHLORIDE: 600; 310; 30; 20 INJECTION, SOLUTION INTRAVENOUS at 08:49

## 2025-08-29 RX ADMIN — PROPOFOL 150 MG: 10 INJECTION, EMULSION INTRAVENOUS at 08:53

## 2025-08-29 RX ADMIN — LIDOCAINE HYDROCHLORIDE 50 MG: 20 INJECTION, SOLUTION EPIDURAL; INFILTRATION; INTRACAUDAL; PERINEURAL at 08:53
